# Patient Record
Sex: MALE | Race: BLACK OR AFRICAN AMERICAN | Employment: OTHER | URBAN - METROPOLITAN AREA
[De-identification: names, ages, dates, MRNs, and addresses within clinical notes are randomized per-mention and may not be internally consistent; named-entity substitution may affect disease eponyms.]

---

## 2017-02-23 ENCOUNTER — APPOINTMENT (OUTPATIENT)
Dept: ULTRASOUND IMAGING | Age: 62
End: 2017-02-23
Attending: NURSE PRACTITIONER
Payer: MEDICARE

## 2017-02-23 ENCOUNTER — HOSPITAL ENCOUNTER (EMERGENCY)
Age: 62
Discharge: HOME | End: 2017-02-24
Attending: STUDENT IN AN ORGANIZED HEALTH CARE EDUCATION/TRAINING PROGRAM
Payer: MEDICARE

## 2017-02-23 ENCOUNTER — APPOINTMENT (OUTPATIENT)
Dept: CT IMAGING | Age: 62
End: 2017-02-23
Attending: NURSE PRACTITIONER
Payer: MEDICARE

## 2017-02-23 DIAGNOSIS — N45.2 ORCHITIS: Primary | ICD-10-CM

## 2017-02-23 LAB
ALBUMIN SERPL BCP-MCNC: 3.5 G/DL (ref 3.5–5)
ALBUMIN/GLOB SERPL: 1.1 {RATIO} (ref 1.1–2.2)
ALP SERPL-CCNC: 44 U/L (ref 45–117)
ALT SERPL-CCNC: 28 U/L (ref 12–78)
ANION GAP BLD CALC-SCNC: 7 MMOL/L (ref 5–15)
APPEARANCE UR: CLEAR
AST SERPL W P-5'-P-CCNC: 25 U/L (ref 15–37)
BACTERIA URNS QL MICRO: NEGATIVE /HPF
BASOPHILS # BLD AUTO: 0 K/UL (ref 0–0.1)
BASOPHILS # BLD: 0 % (ref 0–1)
BILIRUB SERPL-MCNC: 0.3 MG/DL (ref 0.2–1)
BILIRUB UR QL: NEGATIVE
BUN SERPL-MCNC: 14 MG/DL (ref 6–20)
BUN/CREAT SERPL: 13 (ref 12–20)
CALCIUM SERPL-MCNC: 8.6 MG/DL (ref 8.5–10.1)
CHLORIDE SERPL-SCNC: 108 MMOL/L (ref 97–108)
CO2 SERPL-SCNC: 28 MMOL/L (ref 21–32)
COLOR UR: NORMAL
CREAT SERPL-MCNC: 1.12 MG/DL (ref 0.7–1.3)
EOSINOPHIL # BLD: 0.1 K/UL (ref 0–0.4)
EOSINOPHIL NFR BLD: 2 % (ref 0–7)
EPITH CASTS URNS QL MICRO: NORMAL /LPF
ERYTHROCYTE [DISTWIDTH] IN BLOOD BY AUTOMATED COUNT: 13.3 % (ref 11.5–14.5)
GLOBULIN SER CALC-MCNC: 3.1 G/DL (ref 2–4)
GLUCOSE SERPL-MCNC: 93 MG/DL (ref 65–100)
GLUCOSE UR STRIP.AUTO-MCNC: NEGATIVE MG/DL
HCT VFR BLD AUTO: 37.5 % (ref 36.6–50.3)
HGB BLD-MCNC: 12.5 G/DL (ref 12.1–17)
HGB UR QL STRIP: NEGATIVE
HYALINE CASTS URNS QL MICRO: NORMAL /LPF (ref 0–5)
KETONES UR QL STRIP.AUTO: NEGATIVE MG/DL
LEUKOCYTE ESTERASE UR QL STRIP.AUTO: NEGATIVE
LYMPHOCYTES # BLD AUTO: 27 % (ref 12–49)
LYMPHOCYTES # BLD: 1.2 K/UL (ref 0.8–3.5)
MCH RBC QN AUTO: 27.4 PG (ref 26–34)
MCHC RBC AUTO-ENTMCNC: 33.3 G/DL (ref 30–36.5)
MCV RBC AUTO: 82.1 FL (ref 80–99)
MONOCYTES # BLD: 0.7 K/UL (ref 0–1)
MONOCYTES NFR BLD AUTO: 16 % (ref 5–13)
NEUTS SEG # BLD: 2.5 K/UL (ref 1.8–8)
NEUTS SEG NFR BLD AUTO: 55 % (ref 32–75)
NITRITE UR QL STRIP.AUTO: NEGATIVE
PH UR STRIP: 5.5 [PH] (ref 5–8)
PLATELET # BLD AUTO: 145 K/UL (ref 150–400)
POTASSIUM SERPL-SCNC: 4 MMOL/L (ref 3.5–5.1)
PROT SERPL-MCNC: 6.6 G/DL (ref 6.4–8.2)
PROT UR STRIP-MCNC: NEGATIVE MG/DL
RBC # BLD AUTO: 4.57 M/UL (ref 4.1–5.7)
RBC #/AREA URNS HPF: NORMAL /HPF (ref 0–5)
SODIUM SERPL-SCNC: 143 MMOL/L (ref 136–145)
SP GR UR REFRACTOMETRY: 1.02 (ref 1–1.03)
TROPONIN I SERPL-MCNC: <0.04 NG/ML
UROBILINOGEN UR QL STRIP.AUTO: 1 EU/DL (ref 0.2–1)
WBC # BLD AUTO: 4.6 K/UL (ref 4.1–11.1)
WBC URNS QL MICRO: NORMAL /HPF (ref 0–4)

## 2017-02-23 PROCEDURE — 80053 COMPREHEN METABOLIC PANEL: CPT | Performed by: NURSE PRACTITIONER

## 2017-02-23 PROCEDURE — 96361 HYDRATE IV INFUSION ADD-ON: CPT

## 2017-02-23 PROCEDURE — 84484 ASSAY OF TROPONIN QUANT: CPT | Performed by: NURSE PRACTITIONER

## 2017-02-23 PROCEDURE — 99283 EMERGENCY DEPT VISIT LOW MDM: CPT

## 2017-02-23 PROCEDURE — 76870 US EXAM SCROTUM: CPT

## 2017-02-23 PROCEDURE — 96374 THER/PROPH/DIAG INJ IV PUSH: CPT

## 2017-02-23 PROCEDURE — 74011250636 HC RX REV CODE- 250/636: Performed by: NURSE PRACTITIONER

## 2017-02-23 PROCEDURE — 81001 URINALYSIS AUTO W/SCOPE: CPT | Performed by: NURSE PRACTITIONER

## 2017-02-23 PROCEDURE — 85025 COMPLETE CBC W/AUTO DIFF WBC: CPT | Performed by: NURSE PRACTITIONER

## 2017-02-23 PROCEDURE — 93005 ELECTROCARDIOGRAM TRACING: CPT

## 2017-02-23 PROCEDURE — 70450 CT HEAD/BRAIN W/O DYE: CPT

## 2017-02-23 PROCEDURE — 36415 COLL VENOUS BLD VENIPUNCTURE: CPT | Performed by: NURSE PRACTITIONER

## 2017-02-23 RX ORDER — GLUCOSAMINE HCL 500 MG
TABLET ORAL
COMMUNITY

## 2017-02-23 RX ORDER — NYSTATIN 100000 [USP'U]/G
POWDER TOPICAL 4 TIMES DAILY
COMMUNITY
End: 2021-01-14 | Stop reason: ALTCHOICE

## 2017-02-23 RX ORDER — SIMVASTATIN 80 MG/1
80 TABLET, FILM COATED ORAL
COMMUNITY

## 2017-02-23 RX ADMIN — SODIUM CHLORIDE 1000 ML: 900 INJECTION, SOLUTION INTRAVENOUS at 22:40

## 2017-02-24 VITALS
HEART RATE: 69 BPM | OXYGEN SATURATION: 99 % | TEMPERATURE: 97.4 F | BODY MASS INDEX: 27.4 KG/M2 | WEIGHT: 185 LBS | RESPIRATION RATE: 18 BRPM | SYSTOLIC BLOOD PRESSURE: 158 MMHG | HEIGHT: 69 IN | DIASTOLIC BLOOD PRESSURE: 88 MMHG

## 2017-02-24 LAB
ATRIAL RATE: 65 BPM
CALCULATED P AXIS, ECG09: 61 DEGREES
CALCULATED R AXIS, ECG10: -15 DEGREES
CALCULATED T AXIS, ECG11: -4 DEGREES
DIAGNOSIS, 93000: NORMAL
P-R INTERVAL, ECG05: 180 MS
Q-T INTERVAL, ECG07: 380 MS
QRS DURATION, ECG06: 88 MS
QTC CALCULATION (BEZET), ECG08: 395 MS
VENTRICULAR RATE, ECG03: 65 BPM

## 2017-02-24 PROCEDURE — 74011250636 HC RX REV CODE- 250/636: Performed by: NURSE PRACTITIONER

## 2017-02-24 RX ORDER — KETOROLAC TROMETHAMINE 30 MG/ML
30 INJECTION, SOLUTION INTRAMUSCULAR; INTRAVENOUS
Status: COMPLETED | OUTPATIENT
Start: 2017-02-24 | End: 2017-02-24

## 2017-02-24 RX ADMIN — KETOROLAC TROMETHAMINE 30 MG: 30 INJECTION, SOLUTION INTRAMUSCULAR at 00:30

## 2017-02-24 NOTE — ED NOTES
Pt requested warm blanket and lights to be turned off. Door closed per request. NAD noted at this time.

## 2017-02-24 NOTE — ED TRIAGE NOTES
Patient has been feeling \"discombobulated\" and confused this evening, as well as a little dizzy. States has also been having groin pain that started about a week ago, worse when driving.

## 2017-02-24 NOTE — DISCHARGE INSTRUCTIONS
Epididymitis and Orchitis: Care Instructions  Your Care Instructions    Epididymitis is pain and swelling of the tube that is attached to each testicle. This tube is called the epididymis. Orchitis is pain and swelling of the testicle. Infection with bacteria often causes these conditions. Sexually transmitted infections (STIs) also can cause both conditions. This is often the case in men younger than 28. Other causes in boys and older men are infections from surgery or having a catheter that drains urine. The mumps virus also can cause orchitis. Anti-inflammatory or pain medicines can help with the pain. Antibiotics are used if the problem is caused by bacteria. They are not used if a virus is the cause. Your testicle may stay swollen for many days or even a few weeks. The doctor has checked you carefully, but problems can develop later. If you notice any problems or new symptoms, get medical treatment right away. Follow-up care is a key part of your treatment and safety. Be sure to make and go to all appointments, and call your doctor if you are having problems. It's also a good idea to know your test results and keep a list of the medicines you take. How can you care for yourself at home? · If your doctor prescribed antibiotics, take them as directed. Do not stop taking them just because you feel better. You need to take the full course of antibiotics. · Ask your doctor if you can take an over-the-counter pain medicine, such as acetaminophen (Tylenol), ibuprofen (Advil, Motrin), or naproxen (Aleve). Be safe with medicines. Read and follow all instructions on the label. · Limit your activity to what is comfortable. · Wear snug underwear or an athletic supporter. This can help reduce pain. · Apply either cold or heat to the swollen area. Use the one that works best for your pain. Sitting in a warm bath for 15 minutes twice a day will help reduce the swelling more quickly.   · If you have been told that an STI may have caused your condition, do not have sex until your doctor says it is safe. This will prevent spreading the infection. Tell your sex partner or partners that they need to be checked. They may need treatment. When should you call for help? Call your doctor now or seek immediate medical care if:  · Your pain gets worse. · You have a new or higher fever. · You have new or more swelling of your testicle. · You have new belly pain, or your pain gets worse. Watch closely for changes in your health, and be sure to contact your doctor if:  · You do not get better as expected. Where can you learn more? Go to http://hernandez-edd.info/. Enter S360 in the search box to learn more about \"Epididymitis and Orchitis: Care Instructions. \"  Current as of: August 12, 2016  Content Version: 11.1  © 2113-9870 [employer]. Care instructions adapted under license by [employer] (which disclaims liability or warranty for this information). If you have questions about a medical condition or this instruction, always ask your healthcare professional. [address] any warranty or liability for your use of this information. We hope that we have addressed all of your medical concerns. The examination and treatment you received in the Emergency Department were for an emergent problem and were not intended as complete care. It is important that you follow up with your healthcare provider(s) for ongoing care. If your symptoms worsen or do not improve as expected, and you are unable to reach your usual health care provider(s), you should return to the Emergency Department. Today's healthcare is undergoing tremendous change, and patient satisfaction surveys are one of the many tools to assess the quality of medical care. You may receive a survey from the [employer] regarding your experience in the Emergency Department.   I hope that your experience has been completely positive, particularly the medical care that I provided. As such, please participate in the survey; anything less than excellent does not meet my expectations or intentions. [facility] and [facility] participate in nationally recognized quality of care measures. If your blood pressure is greater than 120/80, as reported below, we urge that you seek medical care to address the potential of high blood pressure, commonly known as hypertension. Hypertension can be hereditary or can be caused by certain medical conditions, pain, stress, or \"white coat syndrome. \"       Please make an appointment with your health care provider(s) for follow up of your Emergency Department visit. VITALS:   Patient Vitals for the past 8 hrs:   Temp Pulse Resp BP SpO2   02/23/17 2211 97.4 °F (36.3 °C) 69 18 154/74 98 %          Thank you for allowing us to provide you with medical care today. We realize that you have many choices for your emergency care needs. Please choose us in the future for any continued health care needs. Julianna iPnto  [facility], [address] [place], [place] [address].   Office: [phone]            Recent Results (from the past 24 hour(s))   URINALYSIS W/MICROSCOPIC    Collection Time: 02/23/17 10:40 PM   Result Value Ref Range    Color YELLOW/STRAW      Appearance CLEAR CLEAR      Specific gravity 1.018 1.003 - 1.030      pH (UA) 5.5 5.0 - 8.0      Protein NEGATIVE  NEG mg/dL    Glucose NEGATIVE  NEG mg/dL    Ketone NEGATIVE  NEG mg/dL    Bilirubin NEGATIVE  NEG      Blood NEGATIVE  NEG      Urobilinogen 1.0 0.2 - 1.0 EU/dL    Nitrites NEGATIVE  NEG      Leukocyte Esterase NEGATIVE  NEG      WBC 0-4 0 - 4 /hpf    RBC 0-5 0 - 5 /hpf    Epithelial cells FEW FEW /lpf    Bacteria NEGATIVE  NEG /hpf    Hyaline cast 0-2 0 - 5 /lpf   METABOLIC PANEL, COMPREHENSIVE    Collection Time: 02/23/17 10:51 PM Result Value Ref Range    Sodium 143 136 - 145 mmol/L    Potassium 4.0 3.5 - 5.1 mmol/L    Chloride 108 97 - 108 mmol/L    CO2 28 21 - 32 mmol/L    Anion gap 7 5 - 15 mmol/L    Glucose 93 65 - 100 mg/dL    BUN 14 6 - 20 MG/DL    Creatinine 1.12 0.70 - 1.30 MG/DL    BUN/Creatinine ratio 13 12 - 20      GFR est AA >60 >60 ml/min/1.73m2    GFR est non-AA >60 >60 ml/min/1.73m2    Calcium 8.6 8.5 - 10.1 MG/DL    Bilirubin, total 0.3 0.2 - 1.0 MG/DL    ALT (SGPT) 28 12 - 78 U/L    AST (SGOT) 25 15 - 37 U/L    Alk. phosphatase 44 (L) 45 - 117 U/L    Protein, total 6.6 6.4 - 8.2 g/dL    Albumin 3.5 3.5 - 5.0 g/dL    Globulin 3.1 2.0 - 4.0 g/dL    A-G Ratio 1.1 1.1 - 2.2     CBC WITH AUTOMATED DIFF    Collection Time: 02/23/17 10:51 PM   Result Value Ref Range    WBC 4.6 4.1 - 11.1 K/uL    RBC 4.57 4.10 - 5.70 M/uL    HGB 12.5 12.1 - 17.0 g/dL    HCT 37.5 36.6 - 50.3 %    MCV 82.1 80.0 - 99.0 FL    MCH 27.4 26.0 - 34.0 PG    MCHC 33.3 30.0 - 36.5 g/dL    RDW 13.3 11.5 - 14.5 %    PLATELET 040 (L) 409 - 400 K/uL    NEUTROPHILS 55 32 - 75 %    LYMPHOCYTES 27 12 - 49 %    MONOCYTES 16 (H) 5 - 13 %    EOSINOPHILS 2 0 - 7 %    BASOPHILS 0 0 - 1 %    ABS. NEUTROPHILS 2.5 1.8 - 8.0 K/UL    ABS. LYMPHOCYTES 1.2 0.8 - 3.5 K/UL    ABS. MONOCYTES 0.7 0.0 - 1.0 K/UL    ABS. EOSINOPHILS 0.1 0.0 - 0.4 K/UL    ABS.  BASOPHILS 0.0 0.0 - 0.1 K/UL   TROPONIN I    Collection Time: 02/23/17 10:51 PM   Result Value Ref Range    Troponin-I, Qt. <0.04 <0.05 ng/mL   EKG, 12 LEAD, INITIAL    Collection Time: 02/23/17 11:26 PM   Result Value Ref Range    Ventricular Rate 65 BPM    Atrial Rate 65 BPM    P-R Interval 180 ms    QRS Duration 88 ms    Q-T Interval 380 ms    QTC Calculation (Bezet) 395 ms    Calculated P Axis 61 degrees    Calculated R Axis -15 degrees    Calculated T Axis -4 degrees    Diagnosis       Normal sinus rhythm  Normal ECG  No previous ECGs available         Ct Head Wo Cont    Result Date: 2/24/2017  EXAM:  CT HEAD WO CONT INDICATION:   Headache, dizziness, confusion tonight COMPARISON: None. TECHNIQUE: Unenhanced CT of the head was performed using 5 mm images. Brain and bone windows were generated. CT dose reduction was achieved through use of a standardized protocol tailored for this examination and automatic exposure control for dose modulation. FINDINGS: The ventricles and sulci are normal in size, shape and configuration and midline. There is no significant white matter disease. There is no intracranial hemorrhage, extra-axial collection, mass, mass effect or midline shift. The basilar cisterns are open. No acute infarct is identified. The bone windows demonstrate no abnormalities. The visualized portions of the paranasal sinuses and mastoid air cells are clear. IMPRESSION: No acute abnormality. Us Scrotum/testicles    Result Date: 2/23/2017  EXAM:  US SCROTUM/TESTICLES INDICATION: Scrotal pain for one week. COMPARISON: None. TECHNIQUE: Real-time sonography of the scrotum was performed with a high frequency linear transducer. Multiple static images were obtained. Color Doppler evaluation was also performed. FINDINGS: RIGHT TESTICLE: The right testicle is normal in size and echotexture with normal color-flow. The right testis measures 3.8 x 2.5 x 1.7 cm and the right testicular volume is 8.26 mL. There is a small right hydrocele. RIGHT EPIDIDYMIS: The right epididymis is normal. LEFT TESTICLE: The left testicle is normal in size and echotexture with minimally increased color-flow. The left testis measures 3.4 x 1.9 x 1.6 cm and the left testicular volume is 5.5 mL. There is a 3 mm left epididymal cyst. There is a small hydrocele.  LEFT EPIDIDYMIS: The left epididymis is normal.     IMPRESSION: Minimally increased blood flow to the left testicle possibly related to orchitis

## 2017-02-24 NOTE — ED NOTES
I have reviewed discharge instructions with the patient. The patient verbalized understanding. Pt confirmed understanding of need for follow up with primary care provider. Pt is not in any current distress and shows no evidence of clinical instability. Pt left with all personal belongings. Pt states they are  driving. Pt states chief complaint has improved with treatment provided    PT is alert and oriented x 4, Pt is hemodynamically/respiratorily stable. Paperwork given by provider and reviewed with patient, opportunity for questions/clarification given.

## 2017-02-24 NOTE — ED PROVIDER NOTES
HPI Comments: The pt is a 57year old male who presents with several, seemingly unrelated complaints. Primarily he is complaining of bilateral testicular pain that has been ongoing for several weeks. He has been treated for perineum fungal infection with topical nystatin cream. His wife believes that he is too sexually active. Pt denies penile drainage or dysuria. No history of STI. He also states that he has felt dizzy and disoriented today. While driving to the ED, he developed a headache. Pt denies fevers, chills, night sweats, chest pain, pressure, SOB, FOX, PND, orthopnea, abdominal pain, n/v/d, melena, hematuria, dysuria, constipation, and syncope. Hypercholesterolemia    No past surgical history on file. Patient is a 57 y.o. male presenting with dizziness, groin pain, and headaches. The history is provided by the patient. Dizziness   Pertinent negatives include no agitation. Associated symptoms include headaches. Pertinent negatives include no shortness of breath, no chest pain, no vomiting, no confusion, no choking and no nausea. Groin Pain   Associated symptoms include headaches. Pertinent negatives include no chest pain, no abdominal pain and no shortness of breath. Headache    Associated symptoms include dizziness. Pertinent negatives include no fever, no palpitations, no shortness of breath, no weakness, no nausea and no vomiting. No past medical history on file. No past surgical history on file. No family history on file. Social History     Social History    Marital status:      Spouse name: N/A    Number of children: N/A    Years of education: N/A     Occupational History    Not on file.      Social History Main Topics    Smoking status: Not on file    Smokeless tobacco: Not on file    Alcohol use Not on file    Drug use: Not on file    Sexual activity: Not on file     Other Topics Concern    Not on file     Social History Narrative         ALLERGIES: Pcn [penicillins]    Review of Systems   Constitutional: Negative for activity change, appetite change, chills, diaphoresis, fatigue, fever and unexpected weight change. HENT: Negative for congestion, ear pain, rhinorrhea, sinus pressure, sore throat, tinnitus, trouble swallowing and voice change. Eyes: Negative for pain, discharge, redness and visual disturbance. Respiratory: Negative for apnea, cough, choking, chest tightness, shortness of breath, wheezing and stridor. Cardiovascular: Negative for chest pain, palpitations and leg swelling. Gastrointestinal: Negative for abdominal pain, constipation, nausea and vomiting. Endocrine: Negative for cold intolerance and heat intolerance. Genitourinary: Positive for testicular pain. Negative for difficulty urinating, dysuria, flank pain, hematuria and urgency. Musculoskeletal: Negative for arthralgias, back pain, gait problem, joint swelling, myalgias, neck pain and neck stiffness. Skin: Negative for color change, pallor, rash and wound. Allergic/Immunologic: Negative for immunocompromised state. Neurological: Positive for dizziness and headaches. Negative for tremors, syncope, weakness, light-headedness and numbness. Hematological: Does not bruise/bleed easily. Psychiatric/Behavioral: Negative for agitation, confusion and suicidal ideas. Vitals:    02/23/17 2211 02/24/17 0033   BP: 154/74 158/88   Pulse: 69    Resp: 18    Temp: 97.4 °F (36.3 °C)    SpO2: 98% 99%   Weight: 83.9 kg (185 lb)    Height: 5' 9\" (1.753 m)             Physical Exam   Constitutional: He is oriented to person, place, and time. He appears well-developed and well-nourished. No distress. HENT:   Head: Atraumatic. Nose: Nose normal.   Mouth/Throat: No oropharyngeal exudate. Eyes: Conjunctivae and EOM are normal. Right eye exhibits no discharge. Left eye exhibits no discharge. No scleral icterus. Neck: Normal range of motion. Neck supple. No JVD present.  No tracheal deviation present. No thyromegaly present. Cardiovascular: Normal rate, regular rhythm, normal heart sounds, intact distal pulses and normal pulses. PMI is not displaced. Exam reveals no gallop and no friction rub. No murmur heard. Pulmonary/Chest: Breath sounds normal. No accessory muscle usage or stridor. No respiratory distress. He has no decreased breath sounds. He has no wheezes. He has no rhonchi. He has no rales. He exhibits no tenderness. Abdominal: Soft. Bowel sounds are normal. He exhibits no distension and no mass. There is no hepatosplenomegaly. There is no tenderness. There is no rigidity, no rebound, no guarding, no CVA tenderness, no tenderness at McBurney's point and negative Pulido's sign. Hernia confirmed negative in the right inguinal area and confirmed negative in the left inguinal area. Genitourinary: Penis normal. Right testis shows tenderness. Right testis shows no mass and no swelling. Right testis is descended. Left testis shows tenderness. Left testis shows no mass and no swelling. Left testis is descended. Musculoskeletal: Normal range of motion. He exhibits no edema or tenderness. Lymphadenopathy:     He has no cervical adenopathy. Right: No inguinal adenopathy present. Left: No inguinal adenopathy present. Neurological: He is alert and oriented to person, place, and time. He has normal strength. No cranial nerve deficit or sensory deficit. He displays a negative Romberg sign. Coordination normal. GCS eye subscore is 4. GCS verbal subscore is 5. GCS motor subscore is 6. Skin: Skin is warm and dry. He is not diaphoretic. Psychiatric: He has a normal mood and affect. His behavior is normal.   Vitals reviewed.        MDM  Number of Diagnoses or Management Options  Orchitis:   Diagnosis management comments:    * routine laboratory data and UA   * IVF and toradol   * US testes   * CT head          Amount and/or Complexity of Data Reviewed  Clinical lab tests: ordered and reviewed  Tests in the radiology section of CPT®: ordered and reviewed  Review and summarize past medical records: yes  Discuss the patient with other providers: yes    Risk of Complications, Morbidity, and/or Mortality  General comments:    - pt states that he feels much better   - HA resolved   - no further dizziness    Patient Progress  Patient progress: stable    ED Course       Procedures      1:01 AM  Pt has been reevaluated. There are no new complaints, changes, or physical findings at this time. Medications have been reviewed w/ pt and/or family. Pt and/or family's questions have been answered. Pt and/or family expressed good understanding of the dx/tx/rx and is in agreement with plan of care. Pt instructed and agreed to f/u w/ PCP and Urology and to return to ED upon further deterioration. Pt is ready for discharge.     LABORATORY TESTS:  Recent Results (from the past 12 hour(s))   URINALYSIS W/MICROSCOPIC    Collection Time: 02/23/17 10:40 PM   Result Value Ref Range    Color YELLOW/STRAW      Appearance CLEAR CLEAR      Specific gravity 1.018 1.003 - 1.030      pH (UA) 5.5 5.0 - 8.0      Protein NEGATIVE  NEG mg/dL    Glucose NEGATIVE  NEG mg/dL    Ketone NEGATIVE  NEG mg/dL    Bilirubin NEGATIVE  NEG      Blood NEGATIVE  NEG      Urobilinogen 1.0 0.2 - 1.0 EU/dL    Nitrites NEGATIVE  NEG      Leukocyte Esterase NEGATIVE  NEG      WBC 0-4 0 - 4 /hpf    RBC 0-5 0 - 5 /hpf    Epithelial cells FEW FEW /lpf    Bacteria NEGATIVE  NEG /hpf    Hyaline cast 0-2 0 - 5 /lpf   METABOLIC PANEL, COMPREHENSIVE    Collection Time: 02/23/17 10:51 PM   Result Value Ref Range    Sodium 143 136 - 145 mmol/L    Potassium 4.0 3.5 - 5.1 mmol/L    Chloride 108 97 - 108 mmol/L    CO2 28 21 - 32 mmol/L    Anion gap 7 5 - 15 mmol/L    Glucose 93 65 - 100 mg/dL    BUN 14 6 - 20 MG/DL    Creatinine 1.12 0.70 - 1.30 MG/DL    BUN/Creatinine ratio 13 12 - 20      GFR est AA >60 >60 ml/min/1.73m2    GFR est non-AA >60 >60 ml/min/1.73m2    Calcium 8.6 8.5 - 10.1 MG/DL    Bilirubin, total 0.3 0.2 - 1.0 MG/DL    ALT (SGPT) 28 12 - 78 U/L    AST (SGOT) 25 15 - 37 U/L    Alk. phosphatase 44 (L) 45 - 117 U/L    Protein, total 6.6 6.4 - 8.2 g/dL    Albumin 3.5 3.5 - 5.0 g/dL    Globulin 3.1 2.0 - 4.0 g/dL    A-G Ratio 1.1 1.1 - 2.2     CBC WITH AUTOMATED DIFF    Collection Time: 02/23/17 10:51 PM   Result Value Ref Range    WBC 4.6 4.1 - 11.1 K/uL    RBC 4.57 4.10 - 5.70 M/uL    HGB 12.5 12.1 - 17.0 g/dL    HCT 37.5 36.6 - 50.3 %    MCV 82.1 80.0 - 99.0 FL    MCH 27.4 26.0 - 34.0 PG    MCHC 33.3 30.0 - 36.5 g/dL    RDW 13.3 11.5 - 14.5 %    PLATELET 438 (L) 683 - 400 K/uL    NEUTROPHILS 55 32 - 75 %    LYMPHOCYTES 27 12 - 49 %    MONOCYTES 16 (H) 5 - 13 %    EOSINOPHILS 2 0 - 7 %    BASOPHILS 0 0 - 1 %    ABS. NEUTROPHILS 2.5 1.8 - 8.0 K/UL    ABS. LYMPHOCYTES 1.2 0.8 - 3.5 K/UL    ABS. MONOCYTES 0.7 0.0 - 1.0 K/UL    ABS. EOSINOPHILS 0.1 0.0 - 0.4 K/UL    ABS. BASOPHILS 0.0 0.0 - 0.1 K/UL   TROPONIN I    Collection Time: 02/23/17 10:51 PM   Result Value Ref Range    Troponin-I, Qt. <0.04 <0.05 ng/mL   EKG, 12 LEAD, INITIAL    Collection Time: 02/23/17 11:26 PM   Result Value Ref Range    Ventricular Rate 65 BPM    Atrial Rate 65 BPM    P-R Interval 180 ms    QRS Duration 88 ms    Q-T Interval 380 ms    QTC Calculation (Bezet) 395 ms    Calculated P Axis 61 degrees    Calculated R Axis -15 degrees    Calculated T Axis -4 degrees    Diagnosis       Normal sinus rhythm  Normal ECG  No previous ECGs available         IMAGING RESULTS:  CT HEAD WO CONT   Final Result      US SCROTUM/TESTICLES   Final Result        Ct Head Wo Cont    Result Date: 2/24/2017  EXAM:  CT HEAD WO CONT INDICATION:   Headache, dizziness, confusion tonight COMPARISON: None. TECHNIQUE: Unenhanced CT of the head was performed using 5 mm images. Brain and bone windows were generated.   CT dose reduction was achieved through use of a standardized protocol tailored for this examination and automatic exposure control for dose modulation. FINDINGS: The ventricles and sulci are normal in size, shape and configuration and midline. There is no significant white matter disease. There is no intracranial hemorrhage, extra-axial collection, mass, mass effect or midline shift. The basilar cisterns are open. No acute infarct is identified. The bone windows demonstrate no abnormalities. The visualized portions of the paranasal sinuses and mastoid air cells are clear. IMPRESSION: No acute abnormality. Us Scrotum/testicles    Result Date: 2/23/2017  EXAM:  US SCROTUM/TESTICLES INDICATION: Scrotal pain for one week. COMPARISON: None. TECHNIQUE: Real-time sonography of the scrotum was performed with a high frequency linear transducer. Multiple static images were obtained. Color Doppler evaluation was also performed. FINDINGS: RIGHT TESTICLE: The right testicle is normal in size and echotexture with normal color-flow. The right testis measures 3.8 x 2.5 x 1.7 cm and the right testicular volume is 8.26 mL. There is a small right hydrocele. RIGHT EPIDIDYMIS: The right epididymis is normal. LEFT TESTICLE: The left testicle is normal in size and echotexture with minimally increased color-flow. The left testis measures 3.4 x 1.9 x 1.6 cm and the left testicular volume is 5.5 mL. There is a 3 mm left epididymal cyst. There is a small hydrocele. LEFT EPIDIDYMIS: The left epididymis is normal.     IMPRESSION: Minimally increased blood flow to the left testicle possibly related to orchitis         MEDICATIONS GIVEN:  Medications   sodium chloride 0.9 % bolus infusion 1,000 mL (0 mL IntraVENous IV Completed 2/24/17 0101)   ketorolac (TORADOL) injection 30 mg (30 mg IntraVENous Given 2/24/17 0030)       IMPRESSION:  1. Orchitis        PLAN:  1.    Current Discharge Medication List      CONTINUE these medications which have NOT CHANGED    Details   simvastatin (ZOCOR) 80 mg tablet Take 80 mg by mouth nightly. Cholecalciferol, Vitamin D3, 3,000 unit tab Take  by mouth. CATALINA ROOT, BULK, by Does Not Apply route. nystatin (MYCOSTATIN) powder Apply  to affected area four (4) times daily. 2.   Follow-up Information     Follow up With Details Comments Patricia Green MD In 2 days  [address] [place] [address] [phone]      Your Primary Care Physician  In 1 day          3.  Supportive care     Return to ED if worse       Magda Turner NP  1:01 AM

## 2019-07-06 ENCOUNTER — APPOINTMENT (OUTPATIENT)
Dept: ULTRASOUND IMAGING | Age: 64
End: 2019-07-06
Attending: NURSE PRACTITIONER
Payer: MEDICARE

## 2019-07-06 ENCOUNTER — HOSPITAL ENCOUNTER (EMERGENCY)
Age: 64
Discharge: HOME | End: 2019-07-06
Attending: EMERGENCY MEDICINE
Payer: MEDICARE

## 2019-07-06 VITALS
HEART RATE: 69 BPM | OXYGEN SATURATION: 96 % | DIASTOLIC BLOOD PRESSURE: 95 MMHG | RESPIRATION RATE: 16 BRPM | TEMPERATURE: 98.6 F | WEIGHT: 180 LBS | SYSTOLIC BLOOD PRESSURE: 177 MMHG | BODY MASS INDEX: 26.66 KG/M2 | HEIGHT: 69 IN

## 2019-07-06 DIAGNOSIS — N45.2 ORCHITIS: Primary | ICD-10-CM

## 2019-07-06 DIAGNOSIS — N44.2 TESTICULAR CYST: ICD-10-CM

## 2019-07-06 LAB
ALBUMIN SERPL-MCNC: 3.8 G/DL (ref 3.5–5)
ALBUMIN/GLOB SERPL: 1.1 {RATIO} (ref 1.1–2.2)
ALP SERPL-CCNC: 54 U/L (ref 45–117)
ALT SERPL-CCNC: 19 U/L (ref 12–78)
ANION GAP SERPL CALC-SCNC: 4 MMOL/L (ref 5–15)
APPEARANCE UR: CLEAR
AST SERPL-CCNC: 17 U/L (ref 15–37)
BACTERIA URNS QL MICRO: NEGATIVE /HPF
BASOPHILS # BLD: 0 K/UL (ref 0–0.1)
BASOPHILS NFR BLD: 1 % (ref 0–1)
BILIRUB SERPL-MCNC: 0.4 MG/DL (ref 0.2–1)
BILIRUB UR QL: NEGATIVE
BUN SERPL-MCNC: 11 MG/DL (ref 6–20)
BUN/CREAT SERPL: 9 (ref 12–20)
CALCIUM SERPL-MCNC: 9 MG/DL (ref 8.5–10.1)
CHLORIDE SERPL-SCNC: 109 MMOL/L (ref 97–108)
CO2 SERPL-SCNC: 31 MMOL/L (ref 21–32)
COLOR UR: NORMAL
CREAT SERPL-MCNC: 1.17 MG/DL (ref 0.7–1.3)
DIFFERENTIAL METHOD BLD: ABNORMAL
EOSINOPHIL # BLD: 0 K/UL (ref 0–0.4)
EOSINOPHIL NFR BLD: 1 % (ref 0–7)
EPITH CASTS URNS QL MICRO: NORMAL /LPF
ERYTHROCYTE [DISTWIDTH] IN BLOOD BY AUTOMATED COUNT: 13.8 % (ref 11.5–14.5)
GLOBULIN SER CALC-MCNC: 3.6 G/DL (ref 2–4)
GLUCOSE SERPL-MCNC: 90 MG/DL (ref 65–100)
GLUCOSE UR STRIP.AUTO-MCNC: NEGATIVE MG/DL
HCT VFR BLD AUTO: 43.2 % (ref 36.6–50.3)
HGB BLD-MCNC: 13.4 G/DL (ref 12.1–17)
HGB UR QL STRIP: NEGATIVE
HYALINE CASTS URNS QL MICRO: NORMAL /LPF (ref 0–5)
IMM GRANULOCYTES # BLD AUTO: 0 K/UL (ref 0–0.04)
IMM GRANULOCYTES NFR BLD AUTO: 0 % (ref 0–0.5)
KETONES UR QL STRIP.AUTO: NEGATIVE MG/DL
LEUKOCYTE ESTERASE UR QL STRIP.AUTO: NEGATIVE
LYMPHOCYTES # BLD: 1 K/UL (ref 0.8–3.5)
LYMPHOCYTES NFR BLD: 21 % (ref 12–49)
MCH RBC QN AUTO: 26.6 PG (ref 26–34)
MCHC RBC AUTO-ENTMCNC: 31 G/DL (ref 30–36.5)
MCV RBC AUTO: 85.9 FL (ref 80–99)
MONOCYTES # BLD: 0.6 K/UL (ref 0–1)
MONOCYTES NFR BLD: 13 % (ref 5–13)
NEUTS SEG # BLD: 2.9 K/UL (ref 1.8–8)
NEUTS SEG NFR BLD: 64 % (ref 32–75)
NITRITE UR QL STRIP.AUTO: NEGATIVE
NRBC # BLD: 0 K/UL (ref 0–0.01)
NRBC BLD-RTO: 0 PER 100 WBC
PH UR STRIP: 6.5 [PH] (ref 5–8)
PLATELET # BLD AUTO: 145 K/UL (ref 150–400)
PMV BLD AUTO: 11.4 FL (ref 8.9–12.9)
POTASSIUM SERPL-SCNC: 3.9 MMOL/L (ref 3.5–5.1)
PROT SERPL-MCNC: 7.4 G/DL (ref 6.4–8.2)
PROT UR STRIP-MCNC: NEGATIVE MG/DL
RBC # BLD AUTO: 5.03 M/UL (ref 4.1–5.7)
RBC #/AREA URNS HPF: NORMAL /HPF (ref 0–5)
SODIUM SERPL-SCNC: 144 MMOL/L (ref 136–145)
SP GR UR REFRACTOMETRY: 1 (ref 1–1.03)
UR CULT HOLD, URHOLD: NORMAL
UROBILINOGEN UR QL STRIP.AUTO: 1 EU/DL (ref 0.2–1)
WBC # BLD AUTO: 4.5 K/UL (ref 4.1–11.1)
WBC URNS QL MICRO: NORMAL /HPF (ref 0–4)

## 2019-07-06 PROCEDURE — 99283 EMERGENCY DEPT VISIT LOW MDM: CPT

## 2019-07-06 PROCEDURE — 80053 COMPREHEN METABOLIC PANEL: CPT

## 2019-07-06 PROCEDURE — 74011250636 HC RX REV CODE- 250/636: Performed by: NURSE PRACTITIONER

## 2019-07-06 PROCEDURE — 96372 THER/PROPH/DIAG INJ SC/IM: CPT

## 2019-07-06 PROCEDURE — 85025 COMPLETE CBC W/AUTO DIFF WBC: CPT

## 2019-07-06 PROCEDURE — 36415 COLL VENOUS BLD VENIPUNCTURE: CPT

## 2019-07-06 PROCEDURE — 76870 US EXAM SCROTUM: CPT

## 2019-07-06 PROCEDURE — 81001 URINALYSIS AUTO W/SCOPE: CPT

## 2019-07-06 PROCEDURE — 87491 CHLMYD TRACH DNA AMP PROBE: CPT

## 2019-07-06 RX ORDER — DOXYCYCLINE HYCLATE 100 MG
100 TABLET ORAL 2 TIMES DAILY
Qty: 20 TAB | Refills: 0 | Status: SHIPPED | OUTPATIENT
Start: 2019-07-06 | End: 2019-07-16

## 2019-07-06 RX ADMIN — LIDOCAINE HYDROCHLORIDE 250 MG: 10 INJECTION, SOLUTION EPIDURAL; INFILTRATION; INTRACAUDAL; PERINEURAL at 17:20

## 2019-07-06 NOTE — DISCHARGE INSTRUCTIONS
Epididymitis and Orchitis in Children: Care Instructions  Your Care Instructions    Epididymitis is pain and swelling of the tube that attaches to each testicle. This tube is called the epididymis. Orchitis is pain and swelling of the testicle. Infection with bacteria often causes these problems. Other causes are infections from surgery or from a catheter that drains urine. The mumps virus also can cause orchitis. Pain medicine or anti-inflammatory medicines can help with the pain. Antibiotics are used if the problem is caused by bacteria. They are not used if a virus is the cause. The doctor has checked your child carefully, but problems can develop later. If you notice any problems or new symptoms, get medical treatment right away. Follow-up care is a key part of your child's treatment and safety. Be sure to make and go to all appointments, and call your doctor if your child is having problems. It's also a good idea to know your child's test results and keep a list of the medicines your child takes. How can you care for your child at home? · If the doctor prescribed antibiotics for your child, give them as directed. Do not stop using them just because your child feels better. Your child needs to take the full course of antibiotics. · Be safe with medicines. Read and follow all instructions on the label. ? If the doctor gave your child a prescription medicine for pain, give it as prescribed. ? If your child is not taking a prescription pain medicine, ask your doctor if your child can take an over-the-counter medicine. · Limit your child's activity to what is comfortable. · Have your child wear snug underwear or an athletic supporter. This can help reduce pain. · Apply either cold or heat to the swollen area. Use the one that works best for your child's pain. You may have your child sit in a warm bath for 15 minutes twice a day. This will help reduce the swelling more quickly.   When should you call for help? Call your doctor now or seek immediate medical care if:    · Your child's pain gets worse.     · Your child has a new or higher fever.     · Your child has new or more swelling of the testicle.     · Your child has new belly pain or the pain gets worse.    Watch closely for changes in your child's health, and be sure to contact your doctor if:    · Your child does not get better as expected. Where can you learn more? Go to http://hernandez-edd.info/. Enter X853 in the search box to learn more about \"Epididymitis and Orchitis in Children: Care Instructions. \"  Current as of: March 20, 2018  Content Version: 11.9  © 3031-6275 [employer]. Care instructions adapted under license by [employer] (which disclaims liability or warranty for this information). If you have questions about a medical condition or this instruction, always ask your healthcare professional. [place] [address] any warranty or liability for your use of this information. Patient Education        Epididymitis and Orchitis: Care Instructions  Your Care Instructions    Epididymitis is pain and swelling of the tube that is attached to each testicle. This tube is called the epididymis. Orchitis is pain and swelling of the testicle. Infection with bacteria often causes these conditions. Sexually transmitted infections (STIs) also can cause both conditions. This is often the case in men younger than 28. Other causes in boys and older men are infections from surgery or having a catheter that drains urine. The mumps virus also can cause orchitis. Anti-inflammatory or pain medicines can help with the pain. Antibiotics are used if the problem is caused by bacteria. They are not used if a virus is the cause. Your testicle may stay swollen for many days or even a few weeks. The doctor has checked you carefully, but problems can develop later.  If you notice any problems or new symptoms, get medical treatment right away. Follow-up care is a key part of your treatment and safety. Be sure to make and go to all appointments, and call your doctor if you are having problems. It's also a good idea to know your test results and keep a list of the medicines you take. How can you care for yourself at home? · If your doctor prescribed antibiotics, take them as directed. Do not stop taking them just because you feel better. You need to take the full course of antibiotics. · Ask your doctor if you can take an over-the-counter pain medicine, such as acetaminophen (Tylenol), ibuprofen (Advil, Motrin), or naproxen (Aleve). Be safe with medicines. Read and follow all instructions on the label. · Limit your activity to what is comfortable. · Wear snug underwear or an athletic supporter. This can help reduce pain. · Apply either cold or heat to the swollen area. Use the one that works best for your pain. Sitting in a warm bath for 15 minutes twice a day will help reduce the swelling more quickly. · If you have been told that an STI may have caused your condition, do not have sex until your doctor says it is safe. This will prevent spreading the infection. Tell your sex partner or partners that they need to be checked. They may need treatment. When should you call for help? Call your doctor now or seek immediate medical care if:    · Your pain gets worse.     · You have a new or higher fever.     · You have new or more swelling of your testicle.     · You have new belly pain, or your pain gets worse.    Watch closely for changes in your health, and be sure to contact your doctor if:    · You do not get better as expected. Where can you learn more? Go to http://hernandez-edd.info/. Enter S360 in the search box to learn more about \"Epididymitis and Orchitis: Care Instructions. \"  Current as of: March 20, 2018  Content Version: 11.9  © 9484-8987 [employer].  Care instructions adapted under license by [employer] (which disclaims liability or warranty for this information). If you have questions about a medical condition or this instruction, always ask your healthcare professional. [address] any warranty or liability for your use of this information.

## 2019-07-06 NOTE — ED PROVIDER NOTES
I have evaluated the patient as the Provider in Triage. I have reviewed His vital signs and the triage nurse assessment. I have talked with the patient and any available family and advised that I am the provider in triage and have ordered the appropriate study to initiate their work up based on the clinical presentation during my assessment. I have advised that the patient will be accommodated in the Main ED as soon as possible. I have also requested to contact the triage nurse or myself immediately if the patient experiences any changes in their condition during this brief waiting period. 75 y.o. male with no significant past medical history who presents from private vehicle with chief complaint of testicle pain. Pt reports testicle pain, accompanied by rectal pain since his colonoscopy 2 years ago. There are no other acute medical concerns at this time. Note written by Tucker Sanford, as dictated by Tigist Allen MD 2:13 PM    The history is provided by the patient. No  was used. No past medical history on file. No past surgical history on file. No family history on file.     Social History     Socioeconomic History    Marital status:      Spouse name: Not on file    Number of children: Not on file    Years of education: Not on file    Highest education level: Not on file   Occupational History    Not on file   Social Needs    Financial resource strain: Not on file    Food insecurity:     Worry: Not on file     Inability: Not on file    Transportation needs:     Medical: Not on file     Non-medical: Not on file   Tobacco Use    Smoking status: Not on file   Substance and Sexual Activity    Alcohol use: Not on file    Drug use: Not on file    Sexual activity: Not on file   Lifestyle    Physical activity:     Days per week: Not on file     Minutes per session: Not on file    Stress: Not on file   Relationships    Social connections: Talks on phone: Not on file     Gets together: Not on file     Attends Mu-ism service: Not on file     Active member of club or organization: Not on file     Attends meetings of clubs or organizations: Not on file     Relationship status: Not on file    Intimate partner violence:     Fear of current or ex partner: Not on file     Emotionally abused: Not on file     Physically abused: Not on file     Forced sexual activity: Not on file   Other Topics Concern    Not on file   Social History Narrative    Not on file         ALLERGIES: Pcn [penicillins]    Review of Systems   Constitutional: Negative. HENT: Negative. Eyes: Negative. Respiratory: Negative. Gastrointestinal: Negative. Endocrine: Negative. Genitourinary: Positive for penile pain and testicular pain. Negative for decreased urine volume, difficulty urinating, discharge, dysuria, enuresis, flank pain, frequency, genital sores, hematuria, penile swelling, scrotal swelling and urgency. Allergic/Immunologic: Negative. Neurological: Negative. Hematological: Negative. Psychiatric/Behavioral: Negative. There were no vitals filed for this visit. Physical Exam   Constitutional: He is oriented to person, place, and time. He appears well-developed and well-nourished. No distress. HENT:   Head: Normocephalic and atraumatic. Right Ear: External ear normal.   Left Ear: External ear normal.   Nose: Nose normal.   Mouth/Throat: Oropharynx is clear and moist.   Eyes: Pupils are equal, round, and reactive to light. Conjunctivae and EOM are normal. No scleral icterus. Neck: Normal range of motion. Neck supple. No JVD present. No tracheal deviation present. Cardiovascular: Normal rate, regular rhythm, normal heart sounds and intact distal pulses. Pulmonary/Chest: Effort normal and breath sounds normal. He has no wheezes. Abdominal: Soft. Bowel sounds are normal. He exhibits no distension. There is no tenderness. There is no guarding. Hernia confirmed negative in the right inguinal area and confirmed negative in the left inguinal area. Genitourinary: Cremasteric reflex is present. Right testis shows no swelling and no tenderness. Left testis shows tenderness. Left testis shows no swelling. Uncircumcised. No phimosis or penile tenderness. No discharge found. Musculoskeletal: Normal range of motion. He exhibits no edema, tenderness or deformity. Lymphadenopathy:     He has no cervical adenopathy. No inguinal adenopathy noted on the right or left side. Neurological: He is alert and oriented to person, place, and time. No cranial nerve deficit. He exhibits normal muscle tone. Coordination normal.   Skin: Skin is warm and dry. Capillary refill takes less than 2 seconds. No rash noted. He is not diaphoretic. Psychiatric: He has a normal mood and affect. His behavior is normal. Judgment and thought content normal.   Nursing note and vitals reviewed. MDM  Number of Diagnoses or Management Options  Orchitis: new and requires workup  Testicular cyst: new and requires workup  Diagnosis management comments:     Presents with acute left sided testicular pain and discomfort. Has had orchitis in the past. Presently, is followed for \"male problems\" at [facility]. Physical examination reveals some left sided testicular pain, but otherwise unremarkable. US is normal with the exception of some small right sided testicular cysts. Will treat and have him follow up with urology.         Amount and/or Complexity of Data Reviewed  Clinical lab tests: ordered  Tests in the radiology section of CPT®: ordered  Discuss the patient with other providers: yes  Independent visualization of images, tracings, or specimens: yes           Procedures

## 2019-07-08 LAB
C TRACH DNA SPEC QL NAA+PROBE: NEGATIVE
N GONORRHOEA DNA SPEC QL NAA+PROBE: NEGATIVE
SAMPLE TYPE: NORMAL
SERVICE CMNT-IMP: NORMAL
SPECIMEN SOURCE: NORMAL

## 2020-10-01 ENCOUNTER — HOSPITAL ENCOUNTER (EMERGENCY)
Age: 65
Discharge: HOME | End: 2020-10-01
Payer: MEDICARE

## 2020-10-01 VITALS
OXYGEN SATURATION: 100 % | TEMPERATURE: 98.6 F | SYSTOLIC BLOOD PRESSURE: 147 MMHG | RESPIRATION RATE: 18 BRPM | HEART RATE: 59 BPM | DIASTOLIC BLOOD PRESSURE: 72 MMHG | HEIGHT: 69 IN | BODY MASS INDEX: 28.88 KG/M2 | WEIGHT: 195 LBS

## 2020-10-01 DIAGNOSIS — H16.292 CHEMICAL KERATOCONJUNCTIVITIS OF LEFT EYE: ICD-10-CM

## 2020-10-01 DIAGNOSIS — T26.92XA CHEMICAL INJURY OF LEFT EYE: Primary | ICD-10-CM

## 2020-10-01 PROCEDURE — 99284 EMERGENCY DEPT VISIT MOD MDM: CPT

## 2020-10-01 PROCEDURE — 74011000250 HC RX REV CODE- 250: Performed by: NURSE PRACTITIONER

## 2020-10-01 RX ORDER — ERYTHROMYCIN 5 MG/G
OINTMENT OPHTHALMIC
Qty: 3.5 G | Refills: 0 | Status: SHIPPED | OUTPATIENT
Start: 2020-10-01 | End: 2020-10-08

## 2020-10-01 RX ORDER — SODIUM CHLORIDE 9 MG/ML
50 INJECTION, SOLUTION INTRAVENOUS ONCE
Status: DISCONTINUED | OUTPATIENT
Start: 2020-10-01 | End: 2020-10-01 | Stop reason: HOSPADM

## 2020-10-01 RX ORDER — TETRACAINE HYDROCHLORIDE 5 MG/ML
1 SOLUTION OPHTHALMIC
Status: COMPLETED | OUTPATIENT
Start: 2020-10-01 | End: 2020-10-01

## 2020-10-01 RX ADMIN — FLUORESCEIN SODIUM 1 STRIP: 1 STRIP OPHTHALMIC at 18:29

## 2020-10-01 RX ADMIN — TETRACAINE HYDROCHLORIDE 1 DROP: 5 SOLUTION OPHTHALMIC at 18:39

## 2020-10-01 NOTE — ED TRIAGE NOTES
States he was working on removing a battery from a car when [employer]" struck him in L eye, states he flushed prior to arriving in ER with little relief, states vision is poor in L eye, L eye very red, irritated, and tearful Tachypnea

## 2020-10-01 NOTE — ED PROVIDER NOTES
EMERGENCY DEPARTMENT HISTORY AND PHYSICAL EXAM      Date: 10/1/2020  Patient Name: Sarkis Sherman Friday    History of Presenting Illness     Chief Complaint   Patient presents with    Foreign Body in Eye       History Provided By: Patient    HPI: Sarkis Sherman Friday, 77 y.o. male with a past medical history significant diabetes and hypertension presents to the ED with cc of body in eye. Patient states he was working on a car where he might of splashed battery acid in his eye. Patient flushed his eye at home for 5 minutes. Patient still feels like there is a foreign body in his left eye. There are no other complaints, changes, or physical findings at this time. PCP: Brayden, MD Ekta    Current Facility-Administered Medications   Medication Dose Route Frequency Provider Last Rate Last Dose    0.9% sodium chloride infusion  50 mL/hr Ophthalmic Daphne Harada, NP         Current Outpatient Medications   Medication Sig Dispense Refill    erythromycin (ILOTYCIN) ophthalmic ointment Half inch 4 times a day to left eye 3.5 g 0    simvastatin (ZOCOR) 80 mg tablet Take 80 mg by mouth nightly.  Cholecalciferol, Vitamin D3, 3,000 unit tab Take  by mouth.  nystatin (MYCOSTATIN) powder Apply  to affected area four (4) times daily.  CATALINA ROOT, BULK, by Does Not Apply route. Past History     Past Medical History:  No past medical history on file. Past Surgical History:  No past surgical history on file. Family History:  No family history on file. Social History:  Social History     Tobacco Use    Smoking status: Not on file   Substance Use Topics    Alcohol use: Not on file    Drug use: Not on file       Allergies: Allergies   Allergen Reactions    Pcn [Penicillins] Hives         Review of Systems     Review of Systems   Constitutional: Negative for chills and fever. HENT: Negative for dental problem and sore throat. Eyes: Positive for pain and redness.  Negative for photophobia, discharge and visual disturbance. Respiratory: Negative for cough and chest tightness. Cardiovascular: Negative for chest pain. Gastrointestinal: Negative for diarrhea and nausea. Genitourinary: Negative for difficulty urinating and frequency. Musculoskeletal: Negative for gait problem and joint swelling. Neurological: Positive for headaches. Negative for numbness. Hematological: Negative for adenopathy. Does not bruise/bleed easily. Psychiatric/Behavioral: Negative for behavioral problems and suicidal ideas. Physical Exam     Physical Exam  Constitutional:       General: He is not in acute distress. Appearance: Normal appearance. He is not ill-appearing or toxic-appearing. HENT:      Head: Normocephalic and atraumatic. Nose: Nose normal.      Mouth/Throat:      Mouth: Mucous membranes are moist.   Eyes:      General: Lids are normal. Lids are everted, no foreign bodies appreciated. Vision grossly intact. Gaze aligned appropriately. Left eye: No foreign body, discharge or hordeolum. Extraocular Movements: Extraocular movements intact. Conjunctiva/sclera:      Left eye: Left conjunctiva is not injected. No chemosis, exudate or hemorrhage. Pupils: Pupils are equal, round, and reactive to light. Comments: Patient's pH of eye was checked after flushing and it was 7-7.5. Neck:      Musculoskeletal: Normal range of motion and neck supple. Cardiovascular:      Rate and Rhythm: Normal rate and regular rhythm. Pulmonary:      Effort: Pulmonary effort is normal.      Breath sounds: Normal breath sounds. Abdominal:      General: Bowel sounds are normal.   Musculoskeletal: Normal range of motion. Skin:     General: Skin is warm and dry. Capillary Refill: Capillary refill takes less than 2 seconds. Neurological:      General: No focal deficit present. Mental Status: He is alert and oriented to person, place, and time.    Psychiatric:         Mood and Affect: Mood normal.         Behavior: Behavior normal.         Diagnostic Study Results     Labs -   No results found for this or any previous visit (from the past 12 hour(s)). Radiologic Studies -   [unfilled]  CT Results  (Last 48 hours)    None        CXR Results  (Last 48 hours)    None          Medical Decision Making and ED Course   I am the first provider for this patient. I reviewed the vital signs, available nursing notes, past medical history, past surgical history, family history and social history. Vital Signs-Reviewed the patient's vital signs. Patient Vitals for the past 12 hrs:   Temp Pulse Resp BP SpO2   10/01/20 1700  75 20  99 %   10/01/20 1650 98.6 °F (37 °C) 67 20 (!) 181/82 99 %           Records Reviewed: Nursing Notes and Old Medical Records    The patient presents with foreign body sensation left eye with a differential diagnosis of chemical burn, corneal ulcer corneal abrasion ruptured globe      Provider Notes (Medical Decision Making): Under fluorescein patient had no appreciable abrasions. Patient's pH was normal.  Patient has follow-up with [facility] tomorrow. [facility]       ED Course:   Initial assessment performed. The patients presenting problems have been discussed, and they are in agreement with the care plan formulated and outlined with them. I have encouraged them to ask questions as they arise throughout their visit. Procedures       Terrilee Dilling, NP  Procedures         HYPERTENSION COUNSELING: Education was provided to the patient today regarding their hypertension. Patient is made aware of their elevated blood pressure and is instructed to follow up this week with their Primary Care for a recheck. Patient is counseled regarding consequences of chronic, uncontrolled hypertension including kidney disease, heart disease, stroke or even death. Patient states their understanding and agrees to follow up this week.  Additionally, during their visit, I discussed sodium restriction, maintaining ideal body weight and regular exercise program as physiologic means to achieve blood pressure control. The patient will strive towards this. Disposition       Discharged      DISCHARGE PLAN:  1. Current Discharge Medication List      CONTINUE these medications which have NOT CHANGED    Details   simvastatin (ZOCOR) 80 mg tablet Take 80 mg by mouth nightly. Cholecalciferol, Vitamin D3, 3,000 unit tab Take  by mouth. nystatin (MYCOSTATIN) powder Apply  to affected area four (4) times daily. CATALINA ROOT, BULK, by Does Not Apply route. 2.   Follow-up Information     Follow up With Specialties Details Why [address] [place] [id number]        3. Return to ED if worse     Diagnosis     Clinical Impression:   1. Chemical injury of left eye    2. Chemical keratoconjunctivitis of left eye        Attestations:    Reid Mobley NP    Please note that this dictation was completed with [employer], the computer voice recognition software. Quite often unanticipated grammatical, syntax, homophones, and other interpretive errors are inadvertently transcribed by the computer software. Please disregard these errors. Please excuse any errors that have escaped final proofreading. Thank you.

## 2021-01-14 ENCOUNTER — OFFICE VISIT (OUTPATIENT)
Dept: UROLOGY | Age: 66
End: 2021-01-14
Payer: MEDICARE

## 2021-01-14 VITALS — BODY MASS INDEX: 28.14 KG/M2 | WEIGHT: 190 LBS | HEIGHT: 69 IN | TEMPERATURE: 97.2 F

## 2021-01-14 DIAGNOSIS — R35.1 NOCTURIA: ICD-10-CM

## 2021-01-14 DIAGNOSIS — R39.14 BENIGN PROSTATIC HYPERPLASIA WITH INCOMPLETE BLADDER EMPTYING: Primary | ICD-10-CM

## 2021-01-14 DIAGNOSIS — N40.1 BENIGN PROSTATIC HYPERPLASIA WITH INCOMPLETE BLADDER EMPTYING: Primary | ICD-10-CM

## 2021-01-14 DIAGNOSIS — N50.3 EPIDIDYMAL CYST: ICD-10-CM

## 2021-01-14 DIAGNOSIS — N44.2 TESTICULAR CYST: ICD-10-CM

## 2021-01-14 DIAGNOSIS — R39.14 FEELING OF INCOMPLETE BLADDER EMPTYING: ICD-10-CM

## 2021-01-14 LAB
BILIRUB UR QL STRIP: NEGATIVE
GLUCOSE UR-MCNC: NEGATIVE MG/DL
KETONES P FAST UR STRIP-MCNC: NEGATIVE MG/DL
PH UR STRIP: 8 [PH] (ref 4.6–8)
PROT UR QL STRIP: NEGATIVE
PVR POC: 10 CC
SP GR UR STRIP: 1.02 (ref 1–1.03)
UA UROBILINOGEN AMB POC: NORMAL (ref 0.2–1)
URINALYSIS CLARITY POC: CLEAR
URINALYSIS COLOR POC: YELLOW
URINE BLOOD POC: NORMAL
URINE LEUKOCYTES POC: NEGATIVE
URINE NITRITES POC: NEGATIVE

## 2021-01-14 PROCEDURE — 81003 URINALYSIS AUTO W/O SCOPE: CPT | Performed by: UROLOGY

## 2021-01-14 PROCEDURE — 51798 US URINE CAPACITY MEASURE: CPT | Performed by: UROLOGY

## 2021-01-14 PROCEDURE — 99203 OFFICE O/P NEW LOW 30 MIN: CPT | Performed by: UROLOGY

## 2021-01-14 RX ORDER — TAMSULOSIN HYDROCHLORIDE 0.4 MG/1
0.4 CAPSULE ORAL DAILY
Qty: 30 CAP | Refills: 3 | Status: SHIPPED | OUTPATIENT
Start: 2021-01-14 | End: 2021-02-13

## 2021-01-14 RX ORDER — ASPIRIN 81 MG/1
TABLET ORAL DAILY
COMMUNITY
End: 2022-01-14 | Stop reason: ALTCHOICE

## 2021-01-14 NOTE — PROGRESS NOTES
HISTORY OF PRESENT ILLNESS  Jessica Walden Friday is a 77 y.o. male. Chief Complaint   Patient presents with    New Patient    Prostatitis     He is here to establish care and have his prostate checked. He reports that he has had PSA's done in the past with \"good\" results. He has them checked at the [place]. No history of prostate cancer in the family. IPPS today is 12/2. He tells me he often feels that he cannot empty his bladder and double voids. His PVR today is 10cc. He also complains of frequency, urinating 5-6 times/day and is up 3 times/night, every night. However, in the last 1 hour here, he has voided 3 times. He drinks lots fluids each day. He has minimal intermittency and weak stream.  He has never been on any medications for his symptoms. He has a history of testicular and epididymal cyts; not bothersome to him. He does tell me he has some mildly bothersome scrotal pruritis. He denies dysuria, hematuria, or incontinence. He does not have problems with constipation. Chronic Conditions Addressed Today     1. Testicular cyst     Overview      US 2019: left testicle is normal in size with normal color-flow. There is an incidental 4 mm cyst.          Current Assessment & Plan      No concerns on exam         2. Epididymal cyst     Overview      US 7/2019: (right sided) several cysts are present in the epididymis with the largest measuring 6 mm. Otherwise unremarkable. Current Assessment & Plan      No change, not concerning         3. Benign prostatic hyperplasia with incomplete bladder emptying - Primary     Overview      He feels he double voids and does not empty. Current Assessment & Plan      He is not bothered. Relevant Medications     tamsulosin (FLOMAX) 0.4 mg capsule     Other Relevant Orders     AMB POC URINALYSIS DIP STICK AUTO W/O MICRO     AMB POC PVR, ANDRE,POST-VOID RES,US,NON-IMAGING     PSA W/ REFLX FREE PSA     URINALYSIS W/MICROSCOPIC    4. Feeling of incomplete bladder emptying     Overview      He feels that he cannot empty and often double voids. PVR today 10cc. Current Assessment & Plan      No objective concerns. He is not symptomatically bothered. High fluid intake may make him void more. Relevant Medications     tamsulosin (FLOMAX) 0.4 mg capsule     Other Relevant Orders     AMB POC URINALYSIS DIP STICK AUTO W/O MICRO     AMB POC PVR, ANDRE,POST-VOID RES,US,NON-IMAGING     URINALYSIS W/MICROSCOPIC    5. Nocturia     Overview      He is up 3 times/night. Current Assessment & Plan      Partially fluid dependent. He is not bothered. Review of Systems   Gastrointestinal:        Per HPI   Genitourinary:        Per HPI   All other systems reviewed and are negative. Past Medical History:   Diagnosis Date    Hypercholesterolemia     Hypertension       History reviewed. No pertinent surgical history. History reviewed. No pertinent family history. Physical Exam  Vitals signs reviewed. Constitutional:       General: He is not in acute distress. Appearance: Normal appearance. HENT:      Head: Normocephalic and atraumatic. Eyes:      Extraocular Movements: Extraocular movements intact. Conjunctiva/sclera: Conjunctivae normal.      Pupils: Pupils are equal, round, and reactive to light. Neck:      Musculoskeletal: Normal range of motion. Cardiovascular:      Rate and Rhythm: Normal rate and regular rhythm. Pulmonary:      Effort: Pulmonary effort is normal. No respiratory distress. Breath sounds: Normal breath sounds. No wheezing or rhonchi. Abdominal:      General: There is no distension. Palpations: Abdomen is soft. Genitourinary:     Penis: Normal. No phimosis, hypospadias or tenderness. Testes: Normal.         Right: Mass, tenderness or swelling not present. Left: Mass, tenderness or swelling not present.       Epididymis:      Right: Normal. No mass or tenderness. Left: Normal. No mass or tenderness. Prostate: Enlarged (1+). Not tender and no nodules present. Rectum: Normal.   Musculoskeletal: Normal range of motion. Lymphadenopathy:      Cervical: No cervical adenopathy. Upper Body:      Right upper body: No supraclavicular adenopathy. Left upper body: No supraclavicular adenopathy. Skin:     General: Skin is warm and dry. Neurological:      General: No focal deficit present. Mental Status: He is alert and oriented to person, place, and time. Mental status is at baseline. Psychiatric:         Mood and Affect: Mood normal.         Behavior: Behavior normal.                     ASSESSMENT and PLAN  Diagnoses and all orders for this visit:    1. Benign prostatic hyperplasia with incomplete bladder emptying  Assessment & Plan:  He is not bothered. Orders:  -     AMB POC URINALYSIS DIP STICK AUTO W/O MICRO  -     AMB POC PVR, ANDRE,POST-VOID RES,US,NON-IMAGING  -     PSA W/ REFLX FREE PSA; Future  -     tamsulosin (FLOMAX) 0.4 mg capsule; Take 1 Cap by mouth daily for 30 days.  -     URINALYSIS W/MICROSCOPIC    2. Feeling of incomplete bladder emptying  Assessment & Plan:  No objective concerns. He is not symptomatically bothered. High fluid intake may make him void more. Orders:  -     AMB POC URINALYSIS DIP STICK AUTO W/O MICRO  -     AMB POC PVR, ANDRE,POST-VOID RES,US,NON-IMAGING  -     URINALYSIS W/MICROSCOPIC    3. Nocturia  Assessment & Plan:  Partially fluid dependent. He is not bothered. 4. Testicular cyst  Assessment & Plan:  No concerns on exam      5. Epididymal cyst  Assessment & Plan:  No change, not concerning           Follow-up and Dispositions    · Return in about 1 year (around 1/14/2022).          Christina Dorantes MD

## 2021-01-14 NOTE — ASSESSMENT & PLAN NOTE
No objective concerns. He is not symptomatically bothered. High fluid intake may make him void more.

## 2021-01-15 LAB
APPEARANCE UR: CLEAR
BACTERIA #/AREA URNS HPF: NORMAL /[HPF]
BILIRUB UR QL STRIP: NEGATIVE
CASTS URNS QL MICRO: NORMAL /LPF
COLOR UR: YELLOW
EPI CELLS #/AREA URNS HPF: NORMAL /HPF (ref 0–10)
GLUCOSE UR QL: NEGATIVE
HGB UR QL STRIP: NEGATIVE
KETONES UR QL STRIP: NEGATIVE
LEUKOCYTE ESTERASE UR QL STRIP: NEGATIVE
MICRO URNS: ABNORMAL
MICRO URNS: ABNORMAL
NITRITE UR QL STRIP: NEGATIVE
PH UR STRIP: 8 [PH] (ref 5–7.5)
PROT UR QL STRIP: NEGATIVE
RBC #/AREA URNS HPF: NORMAL /HPF (ref 0–2)
SP GR UR: 1.01 (ref 1–1.03)
UROBILINOGEN UR STRIP-MCNC: 0.2 MG/DL (ref 0.2–1)
WBC #/AREA URNS HPF: NORMAL /HPF (ref 0–5)

## 2021-07-28 ENCOUNTER — HOSPITAL ENCOUNTER (OUTPATIENT)
Age: 66
Setting detail: OUTPATIENT SURGERY
Discharge: HOME | End: 2021-07-28
Attending: SPECIALIST | Admitting: SPECIALIST
Payer: MEDICARE

## 2021-07-28 ENCOUNTER — ANESTHESIA EVENT (OUTPATIENT)
Dept: ENDOSCOPY | Age: 66
End: 2021-07-28
Payer: MEDICARE

## 2021-07-28 ENCOUNTER — ANESTHESIA (OUTPATIENT)
Dept: ENDOSCOPY | Age: 66
End: 2021-07-28
Payer: MEDICARE

## 2021-07-28 VITALS
OXYGEN SATURATION: 100 % | BODY MASS INDEX: 28.8 KG/M2 | RESPIRATION RATE: 15 BRPM | TEMPERATURE: 98.6 F | HEIGHT: 69 IN | WEIGHT: 194.45 LBS | DIASTOLIC BLOOD PRESSURE: 93 MMHG | HEART RATE: 60 BPM | SYSTOLIC BLOOD PRESSURE: 155 MMHG

## 2021-07-28 PROCEDURE — 76060000031 HC ANESTHESIA FIRST 0.5 HR: Performed by: SPECIALIST

## 2021-07-28 PROCEDURE — 74011000250 HC RX REV CODE- 250: Performed by: REGISTERED NURSE

## 2021-07-28 PROCEDURE — 76040000019: Performed by: SPECIALIST

## 2021-07-28 PROCEDURE — 88305 TISSUE EXAM BY PATHOLOGIST: CPT

## 2021-07-28 PROCEDURE — 2709999900 HC NON-CHARGEABLE SUPPLY: Performed by: SPECIALIST

## 2021-07-28 PROCEDURE — 77030013992 HC SNR POLYP ENDOSC BSC -B: Performed by: SPECIALIST

## 2021-07-28 PROCEDURE — 74011250637 HC RX REV CODE- 250/637: Performed by: SPECIALIST

## 2021-07-28 PROCEDURE — 74011250636 HC RX REV CODE- 250/636: Performed by: SPECIALIST

## 2021-07-28 PROCEDURE — 74011250636 HC RX REV CODE- 250/636: Performed by: REGISTERED NURSE

## 2021-07-28 RX ORDER — LIDOCAINE HYDROCHLORIDE 20 MG/ML
INJECTION, SOLUTION EPIDURAL; INFILTRATION; INTRACAUDAL; PERINEURAL AS NEEDED
Status: DISCONTINUED | OUTPATIENT
Start: 2021-07-28 | End: 2021-07-28 | Stop reason: HOSPADM

## 2021-07-28 RX ORDER — FENTANYL CITRATE 50 UG/ML
25 INJECTION, SOLUTION INTRAMUSCULAR; INTRAVENOUS AS NEEDED
Status: DISCONTINUED | OUTPATIENT
Start: 2021-07-28 | End: 2021-07-28 | Stop reason: HOSPADM

## 2021-07-28 RX ORDER — FLUMAZENIL 0.1 MG/ML
0.2 INJECTION INTRAVENOUS
Status: DISCONTINUED | OUTPATIENT
Start: 2021-07-28 | End: 2021-07-28 | Stop reason: HOSPADM

## 2021-07-28 RX ORDER — NALOXONE HYDROCHLORIDE 0.4 MG/ML
0.4 INJECTION, SOLUTION INTRAMUSCULAR; INTRAVENOUS; SUBCUTANEOUS
Status: DISCONTINUED | OUTPATIENT
Start: 2021-07-28 | End: 2021-07-28 | Stop reason: HOSPADM

## 2021-07-28 RX ORDER — DEXTROMETHORPHAN/PSEUDOEPHED 2.5-7.5/.8
1.2 DROPS ORAL
Status: DISCONTINUED | OUTPATIENT
Start: 2021-07-28 | End: 2021-07-28 | Stop reason: HOSPADM

## 2021-07-28 RX ORDER — SODIUM CHLORIDE 9 MG/ML
50 INJECTION, SOLUTION INTRAVENOUS CONTINUOUS
Status: DISCONTINUED | OUTPATIENT
Start: 2021-07-28 | End: 2021-07-28 | Stop reason: HOSPADM

## 2021-07-28 RX ORDER — PROPOFOL 10 MG/ML
INJECTION, EMULSION INTRAVENOUS AS NEEDED
Status: DISCONTINUED | OUTPATIENT
Start: 2021-07-28 | End: 2021-07-28 | Stop reason: HOSPADM

## 2021-07-28 RX ORDER — MIDAZOLAM HYDROCHLORIDE 1 MG/ML
.25-5 INJECTION, SOLUTION INTRAMUSCULAR; INTRAVENOUS AS NEEDED
Status: DISCONTINUED | OUTPATIENT
Start: 2021-07-28 | End: 2021-07-28 | Stop reason: HOSPADM

## 2021-07-28 RX ORDER — SODIUM CHLORIDE 9 MG/ML
INJECTION, SOLUTION INTRAVENOUS
Status: DISCONTINUED | OUTPATIENT
Start: 2021-07-28 | End: 2021-07-28 | Stop reason: HOSPADM

## 2021-07-28 RX ADMIN — PROPOFOL INJECTABLE EMULSION 20 MG: 10 INJECTION, EMULSION INTRAVENOUS at 09:32

## 2021-07-28 RX ADMIN — PROPOFOL INJECTABLE EMULSION 20 MG: 10 INJECTION, EMULSION INTRAVENOUS at 09:38

## 2021-07-28 RX ADMIN — PROPOFOL INJECTABLE EMULSION 20 MG: 10 INJECTION, EMULSION INTRAVENOUS at 09:36

## 2021-07-28 RX ADMIN — PROPOFOL INJECTABLE EMULSION 80 MG: 10 INJECTION, EMULSION INTRAVENOUS at 09:30

## 2021-07-28 RX ADMIN — SODIUM CHLORIDE 50 ML/HR: 9 INJECTION, SOLUTION INTRAVENOUS at 08:36

## 2021-07-28 RX ADMIN — LIDOCAINE HYDROCHLORIDE 100 MG: 20 INJECTION, SOLUTION INTRAVENOUS at 09:30

## 2021-07-28 RX ADMIN — PROPOFOL INJECTABLE EMULSION 20 MG: 10 INJECTION, EMULSION INTRAVENOUS at 09:34

## 2021-07-28 RX ADMIN — SIMETHICONE 80 MG: 20 SUSPENSION/ DROPS ORAL at 09:34

## 2021-07-28 RX ADMIN — SODIUM CHLORIDE: 9 INJECTION, SOLUTION INTRAVENOUS at 09:30

## 2021-07-28 RX ADMIN — PROPOFOL INJECTABLE EMULSION 10 MG: 10 INJECTION, EMULSION INTRAVENOUS at 09:40

## 2021-07-28 NOTE — PERIOP NOTES
Received report from Caity Jordan CRNA. See anesthesia record. Patient taken to post-recovery. Post-recovery report given to [facility]. Patient's ABD remains soft and non-tender post procedure. Pt has no complaints at this time and tolerated the procedure well. Endoscope was pre-cleaned at bedside immediately following procedure by Scott.

## 2021-07-28 NOTE — INTERVAL H&P NOTE
Pre-Endoscopy H&P Update  Chief complaint/HPI/ROS:  The indication for the procedure, the patient's history and the patient's current medications are reviewed prior to the procedure and that data is reported on the H&P to which this document is attached. Any significant complaints with regard to organ systems will be noted, and if not mentioned then a review of systems is not contributory. Past Medical History:   Diagnosis Date    Hypercholesterolemia     Hypertension     Sleep apnea     uses cpap at home. History reviewed. No pertinent surgical history. Social   Social History     Tobacco Use    Smoking status: Never Smoker    Smokeless tobacco: Never Used   Substance Use Topics    Alcohol use: Not Currently      History reviewed. No pertinent family history. Allergies   Allergen Reactions    Pcn [Penicillins] Hives      Prior to Admission Medications   Prescriptions Last Dose Informant Patient Reported? Taking? Cholecalciferol, Vitamin D3, 3,000 unit tab Not Taking at Unknown time  Yes No   Sig: Take  by mouth. Patient not taking: Reported on 7/28/2021   CATALINA ROOT, BULK, Not Taking at Unknown time  Yes No   Sig: by Does Not Apply route. Patient not taking: Reported on 7/28/2021   aspirin delayed-release 81 mg tablet Not Taking at Unknown time  Yes No   Sig: Take  by mouth daily. Patient not taking: Reported on 7/28/2021   simvastatin (ZOCOR) 80 mg tablet Not Taking at Unknown time  Yes No   Sig: Take 80 mg by mouth nightly. Patient not taking: Reported on 7/28/2021      Facility-Administered Medications: None       PHYSICAL EXAM:  The patient is examined immediately prior to the procedure. Visit Vitals  BP (!) 169/76   Pulse (!) 59   Temp 98.1 °F (36.7 °C)   Resp 15   Ht 5' 9\" (1.753 m)   Wt 88.2 kg (194 lb 7.1 oz)   SpO2 98%   BMI 28.71 kg/m²     Gen: Appears comfortable, no distress.   Pulm: comfortable respirations with no abnormal audible breath sounds  HEART: well perfused, no abnormal audible heart sounds  GI: abdomen flat. PLAN:  Informed consent discussion held, patient afforded an opportunity to ask questions and all questions answered. After being advised of the risks, benefits, and alternatives, the patient requested that we proceed and indicated so on a written consent form. Will proceed with procedure as planned.   Raj Gonzalez MD

## 2021-07-28 NOTE — ANESTHESIA PREPROCEDURE EVALUATION
Relevant Problems   No relevant active problems       Anesthetic History   No history of anesthetic complications            Review of Systems / Medical History  Patient summary reviewed, nursing notes reviewed and pertinent labs reviewed    Pulmonary        Sleep apnea: CPAP           Neuro/Psych   Within defined limits           Cardiovascular    Hypertension: well controlled          Hyperlipidemia    Exercise tolerance: >4 METS     GI/Hepatic/Renal  Within defined limits              Endo/Other  Within defined limits           Other Findings              Physical Exam    Airway  Mallampati: II  TM Distance: 4 - 6 cm  Neck ROM: normal range of motion   Mouth opening: Normal     Cardiovascular    Rhythm: regular  Rate: normal         Dental    Dentition: Full lower dentures and Full upper dentures     Pulmonary  Breath sounds clear to auscultation               Abdominal  GI exam deferred       Other Findings            Anesthetic Plan    ASA: 2  Anesthesia type: MAC          Induction: Intravenous  Anesthetic plan and risks discussed with: Patient

## 2021-07-28 NOTE — ANESTHESIA POSTPROCEDURE EVALUATION
Procedure(s):  COLONOSCOPY  ENDOSCOPIC POLYPECTOMY. MAC    Anesthesia Post Evaluation        Patient location during evaluation: bedside  Patient participation: complete - patient participated  Level of consciousness: awake and alert  Pain management: adequate  Airway patency: patent  Anesthetic complications: no  Cardiovascular status: acceptable  Respiratory status: acceptable  Hydration status: acceptable  Post anesthesia nausea and vomiting:  none  Final Post Anesthesia Temperature Assessment:  Normothermia (36.0-37.5 degrees C)      INITIAL Post-op Vital signs:   Vitals Value Taken Time   /66 07/28/21 1028   Temp 37 °C (98.6 °F) 07/28/21 0949   Pulse 66 07/28/21 1029   Resp 11 07/28/21 1029   SpO2 94 % 07/28/21 1029   Vitals shown include unvalidated device data.

## 2021-07-28 NOTE — DISCHARGE INSTRUCTIONS
[address] [facility] SERGE Hampton MD  [phone]      July 28, 2021    Yasmin Hamilton Friday  YOB: 1955    COLONOSCOPY DISCHARGE INSTRUCTIONS    If there is redness at IV site you should apply warm compress to area. If redness or soreness persist contact Dr. Faizan Hampton' or your primary care doctor. There may be a slight amount of blood passed from the rectum. Gaseous discomfort may develop, but walking, belching will help relieve this. You may not operate a vehicle for 12 hours  You may not operate machinery or dangerous appliances for rest of today  You may not drink alcoholic beverages for 12 hours  Avoid making any critical decisions for 24 hours    DIET:  You may resume your normal diet, but some patients find that heavy or large meals may lead to indigestion or vomiting. I suggest a light meal as first food intake. MEDICATIONS:  The use of some over-the-counter pain medication may lead to bleeding after colon biopsies or polyp removal.  Tylenol (also called acetaminophen) is safe to take even if you have had colonoscopy with polyp removal.  Based on the procedure you had today you may not safely take aspirin or aspirin-like products for the next ten (10) days. Remember that Tylenol (also called acetaminophen) is safe to take after colonoscopy even if you have had biopsies or polyps removed. ACTIVITY:  You may resume your normal household activities, but it is recommended that you spend the remainder of the day resting -  avoid any strenuous activity. CALL DR. Donald Sánchez' OFFICE IF:  Increasing pain, nausea, vomiting  Abdominal distension (swelling)  Significant new or increased bleeding (oral or rectal)  Fever/Chills  Chest pain/shortness of breath                       Additional instructions:   Two small polyps removed today, but no colon cancer. I'll send you a letter with the polyp results in 7-10 days. It was an honor to be your doctor today. Please let me or my office staff know if you have any feedback about today's procedure. Deisi Prince MD    Colonoscopy saves lives, and can prevent colon cancer. Everyone aged 48 or older needs colonoscopy.   Tell your family and friends: get the test!

## 2021-07-28 NOTE — PROGRESS NOTES
Renetta Mcdonaldolph Friday 1955  [id number]    Situation:  Verbal report received from: Sydney Palumbo RN   Procedure: Procedure(s):  COLONOSCOPY  ENDOSCOPIC POLYPECTOMY    Background:    Preoperative diagnosis: PERSONAL HISTORY POLYPS  Postoperative diagnosis: 1- Ascending colon polyp. 2- Diverticulosis    :  Dr. Jermaine Johnston  Assistant(s): Endoscopy Technician-1: Luis M Magallanes  Endoscopy RN-1: Ed Tompkins    Specimens:   ID Type Source Tests Collected by Time Destination   1 : Ascending Colon Polyp Preservative   Rebecca Kearney MD 7/28/2021 0935 Pathology     H. Pylori  no    Assessment:    Anesthesia gave intra-procedure sedation and medications, see anesthesia flow sheet no    Intravenous fluids: NS@ KVO     Vital signs stable     Abdominal assessment: round and soft     Recommendation:  Discharge patient per MD order.   Return to floor  Family or Friend   Permission to share finding with family or friend yes

## 2021-07-28 NOTE — H&P
74 y.o. male for open access colonoscopy for screening   Additional data for completion of the targeted pre-endoscopy H&P will be provided under 'H&P interval notes'. Please see that document which will be attached to this.   Jamilah Malcolm MD

## 2021-07-28 NOTE — PROGRESS NOTES
Endoscopy discharge instructions have been reviewed and given to patient. The patient verbalized understanding and acceptance of instructions. Dr. Natalie Tadeo discussed with patient procedure findings and next steps.

## 2021-07-28 NOTE — PROCEDURES
[address] [facility] SERGE Leonardo MD  [phone]      2021    Colonoscopy Procedure Note  Krish Ryedwar Friday  :  1955  [facility] Medical Record Number: [id number]    Indications:     Screening colonoscopy  PCP:  Ekta Owen MD  Anesthesia/Sedation: Conscious Sedation/Moderate Sedation/GETA, see notes  Endoscopist:  Dr. Audrey Hassan  Complications:  None  Estimated Blood Loss:  None    Permit:  The indications, risks, benefits and alternatives were reviewed with the patient or their decision maker who was provided an opportunity to ask questions and all questions were answered. The specific risks of colonoscopy with conscious sedation were reviewed, including but not limited to anesthetic complication, bleeding, adverse drug reaction, missed lesion, infection, IV site reactions, and intestinal perforation which would lead to the need for surgical repair. Alternatives to colonoscopy including radiographic imaging, observation without testing, or laboratory testing were reviewed including the limitations of those alternatives. After considering the options and having all their questions answered, the patient or their decision maker provided both verbal and written consent to proceed. Procedure in Detail:  After obtaining informed consent, positioning of the patient in the left lateral decubitus position, and conduction of a pre-procedure pause or \"time out\" the endoscope was introduced into the anus and advanced to the cecum, which was identified by the ileocecal valve and appendiceal orifice. The quality of the colonic preparation was good. A careful inspection was made as the colonoscope was withdrawn, findings and interventions are described below.     Findings:   Two small polyps ascending colon 4mm 4mm; cold snare removal with complete hemostasis and retrieval.  There is diverticulosis in the sigmoid colon without complications such as bleeding, inflammatory change, or luminal narrowing. Specimens:    See above    Complications:   None; patient tolerated the procedure well. Impression:  Colon polyps (two), and diverticulosis. Recommendations:     - Await pathology. Thank you for entrusting me with this patient's care. Please do not hesitate to contact me with any questions or if I can be of assistance with any of your other patients' GI needs. Signed By: Piyush Soriano MD                        July 28, 2021      Surgical assistant none. Implants none unless specified.

## 2022-01-03 DIAGNOSIS — R39.14 BENIGN PROSTATIC HYPERPLASIA WITH INCOMPLETE BLADDER EMPTYING: ICD-10-CM

## 2022-01-03 DIAGNOSIS — N40.1 BENIGN PROSTATIC HYPERPLASIA WITH INCOMPLETE BLADDER EMPTYING: ICD-10-CM

## 2022-01-10 ENCOUNTER — TELEPHONE (OUTPATIENT)
Dept: UROLOGY | Age: 67
End: 2022-01-10

## 2022-01-12 PROBLEM — Z12.5 PROSTATE CANCER SCREENING: Status: ACTIVE | Noted: 2022-01-12

## 2022-01-12 LAB
PSA SERPL-MCNC: 3.4 NG/ML (ref 0–4)
REFLEX CRITERIA: NORMAL

## 2022-01-14 ENCOUNTER — OFFICE VISIT (OUTPATIENT)
Dept: UROLOGY | Age: 67
End: 2022-01-14
Payer: MEDICARE

## 2022-01-14 VITALS — HEIGHT: 69 IN | BODY MASS INDEX: 28.88 KG/M2 | WEIGHT: 195 LBS

## 2022-01-14 DIAGNOSIS — R35.1 NOCTURIA: ICD-10-CM

## 2022-01-14 DIAGNOSIS — Z12.5 PROSTATE CANCER SCREENING: ICD-10-CM

## 2022-01-14 DIAGNOSIS — R39.14 BENIGN PROSTATIC HYPERPLASIA WITH INCOMPLETE BLADDER EMPTYING: Primary | ICD-10-CM

## 2022-01-14 DIAGNOSIS — N40.1 BENIGN PROSTATIC HYPERPLASIA WITH INCOMPLETE BLADDER EMPTYING: Primary | ICD-10-CM

## 2022-01-14 DIAGNOSIS — R39.14 FEELING OF INCOMPLETE BLADDER EMPTYING: ICD-10-CM

## 2022-01-14 LAB
BILIRUB UR QL STRIP: NEGATIVE
GLUCOSE UR-MCNC: NEGATIVE MG/DL
KETONES P FAST UR STRIP-MCNC: NEGATIVE MG/DL
PH UR STRIP: 7.5 [PH] (ref 4.6–8)
PROT UR QL STRIP: NEGATIVE
SP GR UR STRIP: 1.01 (ref 1–1.03)
UA UROBILINOGEN AMB POC: NORMAL (ref 0.2–1)
URINALYSIS CLARITY POC: CLEAR
URINALYSIS COLOR POC: YELLOW
URINE BLOOD POC: NORMAL
URINE LEUKOCYTES POC: NEGATIVE
URINE NITRITES POC: NEGATIVE

## 2022-01-14 PROCEDURE — 1101F PT FALLS ASSESS-DOCD LE1/YR: CPT | Performed by: UROLOGY

## 2022-01-14 PROCEDURE — G8432 DEP SCR NOT DOC, RNG: HCPCS | Performed by: UROLOGY

## 2022-01-14 PROCEDURE — 99214 OFFICE O/P EST MOD 30 MIN: CPT | Performed by: UROLOGY

## 2022-01-14 PROCEDURE — G8536 NO DOC ELDER MAL SCRN: HCPCS | Performed by: UROLOGY

## 2022-01-14 PROCEDURE — G8427 DOCREV CUR MEDS BY ELIG CLIN: HCPCS | Performed by: UROLOGY

## 2022-01-14 PROCEDURE — 81003 URINALYSIS AUTO W/O SCOPE: CPT | Performed by: UROLOGY

## 2022-01-14 PROCEDURE — 3017F COLORECTAL CA SCREEN DOC REV: CPT | Performed by: UROLOGY

## 2022-01-14 PROCEDURE — G8419 CALC BMI OUT NRM PARAM NOF/U: HCPCS | Performed by: UROLOGY

## 2022-01-14 NOTE — PROGRESS NOTES
HISTORY OF PRESENT ILLNESS  Pardeep Dumont Friday is a 74 y.o. male. Chief Complaint   Patient presents with    Follow-up    Benign Prostatic Hypertrophy    Nocturia    Incomplete Bladder Emptying    Testicular Cyst     Past Medical History:  PMHx (including negatives):  has a past medical history of Hypercholesterolemia, Hypertension, and Sleep apnea. PSurgHx:  has a past surgical history that includes colonoscopy (N/A, 7/28/2021). PSocHx:  reports that he has never smoked. He has never used smokeless tobacco. He reports previous alcohol use. He reports that he does not use drugs. He is here for annual follow up. He is followed for BPH and feelings of incomplete emptying and nocturia. PVR's have not been significant. PSA 1/11/22 was 3.4. We do not have additional values. He previously reported good results. Chronic Conditions Addressed Today     1. Testicular cyst     Overview      US 2019: left testicle is normal in size with normal color-flow. There is an incidental 4 mm cyst.  2021: no concerns on exam.         2. Epididymal cyst     Overview      US 7/2019: (right sided) several cysts are present in the epididymis with the largest measuring 6 mm. Otherwise unremarkable. 2021: no concerns on exam.         3. Benign prostatic hyperplasia with incomplete bladder emptying     Overview      2020: He feels he double voids and does not empty. 2021: not bothered. 4. Feeling of incomplete bladder emptying     Overview      He feels that he cannot empty and often double voids. PVR has not been significant. High fluid intake may make him void more. 5. Nocturia     Overview      2021: He is up 3 times/night. He is not bothered. Patient denies the symptoms of COVID-19 per routine screening guidelines. ROS    Past Medical History:  PMHx (including negatives):  has a past medical history of Hypercholesterolemia, Hypertension, and Sleep apnea.    PSurgHx:  has a past surgical history that includes colonoscopy (N/A, 7/28/2021). PSocHx:  reports that he has never smoked. He has never used smokeless tobacco. He reports previous alcohol use. He reports that he does not use drugs. Physical Exam  Abdominal:      Palpations: There is no mass. Tenderness: There is no abdominal tenderness. There is no guarding or rebound. Hernia: No hernia is present. Genitourinary:     Penis: Normal. No phimosis, hypospadias, tenderness or swelling. Testes: Normal.         Right: Mass, tenderness or swelling not present. Right testis is descended. Left: Mass, tenderness or swelling not present. Left testis is descended. Epididymis:      Right: Not inflamed or enlarged. No tenderness. Left: Not inflamed or enlarged. No tenderness. Prostate: Not enlarged (1+, benign), not tender and no nodules present. ASSESSMENT and PLAN  Diagnoses and all orders for this visit:    1. Benign prostatic hyperplasia with incomplete bladder emptying  Assessment & Plan:   He is not bothered by urination. Incomplete emptying. Orders:  -     AMB POC URINALYSIS DIP STICK AUTO W/O MICRO  -     PSA, DIAGNOSTIC (PROSTATE SPECIFIC AG); Future    2. Feeling of incomplete bladder emptying  Assessment & Plan:  He can double void but he is not bothered. Orders:  -     AMB POC URINALYSIS DIP STICK AUTO W/O MICRO    3. Nocturia  Assessment & Plan:   Improves with fluid restriction. UA clear without infection or hematuria    Orders:  -     AMB POC URINALYSIS DIP STICK AUTO W/O MICRO    4. Prostate cancer screening  Assessment & Plan:   His PSA is normal.  No concerns on exam.            Follow-up and Dispositions    · Return in about 1 year (around 1/14/2023) for PSA prior.          Mignon Farrar MD

## 2022-01-14 NOTE — PROGRESS NOTES
Chief Complaint   Patient presents with    Follow-up    Benign Prostatic Hypertrophy    Nocturia    Incomplete Bladder Emptying    Testicular Cyst     1. Have you been to the ER, urgent care clinic since your last visit? Hospitalized since your last visit? No    2. Have you seen or consulted any other health care providers outside of the [address] [place] [address] since your last visit? Include any pap smears or colon screening.  No  Visit Vitals  Ht 5' 9\" (1.753 m)   Wt 195 lb (88.5 kg)   BMI 28.80 kg/m²

## 2022-02-07 ENCOUNTER — APPOINTMENT (OUTPATIENT)
Dept: GENERAL RADIOLOGY | Age: 67
End: 2022-02-07
Attending: STUDENT IN AN ORGANIZED HEALTH CARE EDUCATION/TRAINING PROGRAM
Payer: MEDICARE

## 2022-02-07 ENCOUNTER — HOSPITAL ENCOUNTER (EMERGENCY)
Age: 67
Discharge: HOME | End: 2022-02-07
Attending: STUDENT IN AN ORGANIZED HEALTH CARE EDUCATION/TRAINING PROGRAM
Payer: MEDICARE

## 2022-02-07 VITALS
WEIGHT: 195 LBS | DIASTOLIC BLOOD PRESSURE: 70 MMHG | TEMPERATURE: 97.4 F | HEIGHT: 69 IN | BODY MASS INDEX: 28.88 KG/M2 | OXYGEN SATURATION: 99 % | SYSTOLIC BLOOD PRESSURE: 133 MMHG | RESPIRATION RATE: 16 BRPM | HEART RATE: 70 BPM

## 2022-02-07 DIAGNOSIS — R07.9 CHEST PAIN WITH LOW RISK OF ACUTE CORONARY SYNDROME: Primary | ICD-10-CM

## 2022-02-07 LAB
ALBUMIN SERPL-MCNC: 3.9 G/DL (ref 3.5–5)
ALBUMIN/GLOB SERPL: 1 {RATIO} (ref 1.1–2.2)
ALP SERPL-CCNC: 53 U/L (ref 45–117)
ALT SERPL-CCNC: 24 U/L (ref 12–78)
ANION GAP SERPL CALC-SCNC: 5 MMOL/L (ref 5–15)
APPEARANCE UR: CLEAR
AST SERPL-CCNC: 31 U/L (ref 15–37)
BACTERIA URNS QL MICRO: NEGATIVE /HPF
BASOPHILS # BLD: 0 K/UL (ref 0–0.1)
BASOPHILS NFR BLD: 1 % (ref 0–1)
BILIRUB SERPL-MCNC: 0.4 MG/DL (ref 0.2–1)
BILIRUB UR QL: NEGATIVE
BNP SERPL-MCNC: 73 PG/ML
BUN SERPL-MCNC: 13 MG/DL (ref 6–20)
BUN/CREAT SERPL: 12 (ref 12–20)
CALCIUM SERPL-MCNC: 9.1 MG/DL (ref 8.5–10.1)
CHLORIDE SERPL-SCNC: 107 MMOL/L (ref 97–108)
CO2 SERPL-SCNC: 26 MMOL/L (ref 21–32)
COLOR UR: ABNORMAL
COMMENT, HOLDF: NORMAL
CREAT SERPL-MCNC: 1.07 MG/DL (ref 0.7–1.3)
D DIMER PPP FEU-MCNC: 0.46 MG/L FEU (ref 0–0.65)
DIFFERENTIAL METHOD BLD: NORMAL
EOSINOPHIL # BLD: 0.1 K/UL (ref 0–0.4)
EOSINOPHIL NFR BLD: 1 % (ref 0–7)
EPITH CASTS URNS QL MICRO: ABNORMAL /LPF
ERYTHROCYTE [DISTWIDTH] IN BLOOD BY AUTOMATED COUNT: 13.2 % (ref 11.5–14.5)
GLOBULIN SER CALC-MCNC: 4.1 G/DL (ref 2–4)
GLUCOSE SERPL-MCNC: 91 MG/DL (ref 65–100)
GLUCOSE UR STRIP.AUTO-MCNC: NEGATIVE MG/DL
HCT VFR BLD AUTO: 43.8 % (ref 36.6–50.3)
HGB BLD-MCNC: 13.9 G/DL (ref 12.1–17)
HGB UR QL STRIP: NEGATIVE
HYALINE CASTS URNS QL MICRO: ABNORMAL /LPF (ref 0–5)
IMM GRANULOCYTES # BLD AUTO: 0 K/UL (ref 0–0.04)
IMM GRANULOCYTES NFR BLD AUTO: 0 % (ref 0–0.5)
KETONES UR QL STRIP.AUTO: ABNORMAL MG/DL
LEUKOCYTE ESTERASE UR QL STRIP.AUTO: NEGATIVE
LYMPHOCYTES # BLD: 1 K/UL (ref 0.8–3.5)
LYMPHOCYTES NFR BLD: 18 % (ref 12–49)
MCH RBC QN AUTO: 27 PG (ref 26–34)
MCHC RBC AUTO-ENTMCNC: 31.7 G/DL (ref 30–36.5)
MCV RBC AUTO: 85 FL (ref 80–99)
MONOCYTES # BLD: 0.6 K/UL (ref 0–1)
MONOCYTES NFR BLD: 12 % (ref 5–13)
NEUTS SEG # BLD: 3.8 K/UL (ref 1.8–8)
NEUTS SEG NFR BLD: 68 % (ref 32–75)
NITRITE UR QL STRIP.AUTO: NEGATIVE
NRBC # BLD: 0 K/UL (ref 0–0.01)
NRBC BLD-RTO: 0 PER 100 WBC
PH UR STRIP: 5.5 [PH] (ref 5–8)
PLATELET # BLD AUTO: 170 K/UL (ref 150–400)
PMV BLD AUTO: 11.8 FL (ref 8.9–12.9)
POTASSIUM SERPL-SCNC: 5.1 MMOL/L (ref 3.5–5.1)
PROT SERPL-MCNC: 8 G/DL (ref 6.4–8.2)
PROT UR STRIP-MCNC: NEGATIVE MG/DL
RBC # BLD AUTO: 5.15 M/UL (ref 4.1–5.7)
RBC #/AREA URNS HPF: ABNORMAL /HPF (ref 0–5)
SAMPLES BEING HELD,HOLD: NORMAL
SODIUM SERPL-SCNC: 138 MMOL/L (ref 136–145)
SP GR UR REFRACTOMETRY: 1.02 (ref 1–1.03)
TROPONIN-HIGH SENSITIVITY: 7 NG/L (ref 0–76)
UR CULT HOLD, URHOLD: NORMAL
UROBILINOGEN UR QL STRIP.AUTO: 0.2 EU/DL (ref 0.2–1)
WBC # BLD AUTO: 5.5 K/UL (ref 4.1–11.1)
WBC URNS QL MICRO: ABNORMAL /HPF (ref 0–4)

## 2022-02-07 PROCEDURE — 71046 X-RAY EXAM CHEST 2 VIEWS: CPT

## 2022-02-07 PROCEDURE — 83735 ASSAY OF MAGNESIUM: CPT

## 2022-02-07 PROCEDURE — 85379 FIBRIN DEGRADATION QUANT: CPT

## 2022-02-07 PROCEDURE — 81001 URINALYSIS AUTO W/SCOPE: CPT

## 2022-02-07 PROCEDURE — 84484 ASSAY OF TROPONIN QUANT: CPT

## 2022-02-07 PROCEDURE — 83036 HEMOGLOBIN GLYCOSYLATED A1C: CPT

## 2022-02-07 PROCEDURE — 80053 COMPREHEN METABOLIC PANEL: CPT

## 2022-02-07 PROCEDURE — 36415 COLL VENOUS BLD VENIPUNCTURE: CPT

## 2022-02-07 PROCEDURE — 99283 EMERGENCY DEPT VISIT LOW MDM: CPT

## 2022-02-07 PROCEDURE — 83880 ASSAY OF NATRIURETIC PEPTIDE: CPT

## 2022-02-07 PROCEDURE — 93005 ELECTROCARDIOGRAM TRACING: CPT

## 2022-02-07 PROCEDURE — 85025 COMPLETE CBC W/AUTO DIFF WBC: CPT

## 2022-02-07 NOTE — DISCHARGE INSTRUCTIONS
Your x-ray and lab work thankfully do not show pneumonia, evidence of heart disease or heart strain. If you continue have symptoms please follow-up with your primary care physician. They could not recommend other testing and consultations. If you have worsening pain or new concerning symptoms please do not hesitate to return.

## 2022-02-07 NOTE — ED TRIAGE NOTES
Patient arrives with c/o left sided headache, bilateral rib cage pain/numbness. Reports having intermittent left arm numbness/pain, and chest congestion. Denies arm numbness/pain in triage. States all sx present for past two. Reports testing positive for covid-19 3 weeks ago.

## 2022-02-07 NOTE — ED PROVIDER NOTES
Ev Larson Friday is a 69 y.o. male with past medical history notable for hypercholesterolemia, pretension, sleep apnea presenting with persistent cough and purulent mucus production. He had COVID-19 3 weeks ago. He had recently tested negative. He has recovered from his associated symptoms for the most part. He has had persistent fatigue however. He was concerned that he is having lateral chest discomfort with deep breathing overnight. Denies associated leg swelling. He denies exertional chest pressure, radiation of the chest discomfort outside the thorax. He has not had diaphoresis, persistent measured fevers but has had subjective fevers were occur overnight. He also notes increased urination. Notes that his vision was blurry last evening, took his blood pressure and was elevated, he has not been taking one of his blood pressure medications at home, took nicardipine and this morning his vision had returned to normal.  Denies dysuria, foul-smelling urine. Received initial 2 dose vaccine series for COVID but did not receive a booster dose. Past Medical History:   Diagnosis Date    Hypercholesterolemia     Hypertension     Sleep apnea     uses cpap at home. Past Surgical History:   Procedure Laterality Date    COLONOSCOPY N/A 7/28/2021    COLONOSCOPY performed by Bety Belcher MD at [facility]         No family history on file.     Social History     Socioeconomic History    Marital status:      Spouse name: Not on file    Number of children: Not on file    Years of education: Not on file    Highest education level: Not on file   Occupational History    Not on file   Tobacco Use    Smoking status: Never Smoker    Smokeless tobacco: Never Used   Vaping Use    Vaping Use: Never used   Substance and Sexual Activity    Alcohol use: Not Currently    Drug use: Never    Sexual activity: Not on file   Other Topics Concern    Not on file   Social History Narrative  Not on file     Social Determinants of Health     Financial Resource Strain:     Difficulty of Paying Living Expenses: Not on file   Food Insecurity:     Worried About Running Out of Food in the Last Year: Not on file    Taniya of Food in the Last Year: Not on file   Transportation Needs:     Lack of Transportation (Medical): Not on file    Lack of Transportation (Non-Medical): Not on file   Physical Activity:     Days of Exercise per Week: Not on file    Minutes of Exercise per Session: Not on file   Stress:     Feeling of Stress : Not on file   Social Connections:     Frequency of Communication with Friends and Family: Not on file    Frequency of Social Gatherings with Friends and Family: Not on file    Attends Worship Services: Not on file    Active Member of [address] [place] [address] or Organizations: Not on file    Attends Club or Organization Meetings: Not on file    Marital Status: Not on file   Intimate Partner Violence:     Fear of Current or Ex-Partner: Not on file    Emotionally Abused: Not on file    Physically Abused: Not on file    Sexually Abused: Not on file   Housing Stability:     Unable to Pay for Housing in the Last Year: Not on file    Number of [place] in the Last Year: Not on file    Unstable Housing in the Last Year: Not on file         ALLERGIES: Pcn [penicillins]    Review of Systems   Constitutional: Positive for fatigue and fever. Negative for chills. HENT: Negative for ear pain, sore throat and trouble swallowing. Eyes: Negative for visual disturbance. Respiratory: Positive for cough and shortness of breath. Cardiovascular: Negative for chest pain. Gastrointestinal: Negative for abdominal pain. Genitourinary: Negative for dysuria. Musculoskeletal: Negative for back pain. Skin: Negative for rash. Neurological: Positive for weakness. Negative for light-headedness and headaches. Psychiatric/Behavioral: Negative for confusion.    All other systems reviewed and are negative. Vitals:    22 1445 22 1447 22 1455 22 1733   BP:   (!) 153/75 133/70   Pulse:   71 70   Resp:   20 16   Temp:   97.7 °F (36.5 °C) 97.4 °F (36.3 °C)   SpO2: 98% 98% 98% 99%   Weight:   88.5 kg (195 lb)    Height:   5' 9\" (1.753 m)             Physical Exam  Vitals reviewed. Constitutional:       General: He is not in acute distress. Appearance: He is not toxic-appearing. HENT:      Head: Normocephalic and atraumatic. Mouth/Throat:      Mouth: Mucous membranes are moist.   Eyes:      Extraocular Movements: Extraocular movements intact. Cardiovascular:      Rate and Rhythm: Normal rate and regular rhythm. Heart sounds: Normal heart sounds. Pulmonary:      Effort: Pulmonary effort is normal. No respiratory distress. Breath sounds: Normal breath sounds. No wheezing. Abdominal:      Palpations: Abdomen is soft. Tenderness: There is no abdominal tenderness. Musculoskeletal:         General: Normal range of motion. Cervical back: Normal range of motion. Right lower leg: No edema. Left lower leg: No edema. Skin:     Capillary Refill: Capillary refill takes less than 2 seconds. Coloration: Skin is not jaundiced. Findings: No bruising. Neurological:      General: No focal deficit present. Mental Status: He is alert and oriented to person, place, and time.    Psychiatric:         Mood and Affect: Mood normal.          MDM  Number of Diagnoses or Management Options        MEDICAL DECISION MAKIN y.o. male presents with Rib Pain and Headache    Differential diagnosis includes but not limited to: Pleurisy, PE, pneumonia, pleural effusion, heart failure    LABORATORY TESTS:  Labs Reviewed   METABOLIC PANEL, COMPREHENSIVE - Abnormal; Notable for the following components:       Result Value    Globulin 4.1 (*)     A-G Ratio 1.0 (*)     All other components within normal limits   URINALYSIS W/MICROSCOPIC - Abnormal; Notable for the following components:    Ketone TRACE (*)     All other components within normal limits   URINE CULTURE HOLD SAMPLE   CBC WITH AUTOMATED DIFF   D DIMER   SAMPLES BEING HELD   MAGNESIUM   NT-PRO BNP   TROPONIN-HIGH SENSITIVITY   HEMOGLOBIN A1C WITH EAG       IMAGING RESULTS:  XR CHEST PA LAT   Final Result   Normal chest.          MEDICATIONS GIVEN:  Medications - No data to display    PROGRESS NOTE:   5:48 PM Patient's symptoms have improved after treatment    EKG:  Reviewed     CONSULTS:  Discussed with family at bedside    IMPRESSION:  1. Chest pain with low risk of acute coronary syndrome        PLAN:  -Patient feeling well now. No symptoms at present. All of his results are reassuring including troponin, D-dimer testing. X-ray is clear. Patient was updated verbally and provided written documentation. Discharge  Current Discharge Medication List        Follow-up Information    None       Return precautions given    Rogelio Bocanegra MD          Please note that this dictation was completed with [employer], the computer voice recognition software. Quite often unanticipated grammatical, syntax, homophones, and other interpretive errors are inadvertently transcribed by the computer software. Please disregard these errors. Please excuse any errors that have escaped final proofreading. ED Course as of 02/07/22 1751   Mon Feb 07, 2022   1600 Sinus bradycardia ventricular rate 59, normal axis, no ST elevation.   Likely normal EKG. [NS]      ED Course User Index  [NS] Letha Forte MD       Procedures

## 2022-02-08 LAB
ATRIAL RATE: 59 BPM
CALCULATED P AXIS, ECG09: 28 DEGREES
CALCULATED R AXIS, ECG10: -8 DEGREES
CALCULATED T AXIS, ECG11: 16 DEGREES
DIAGNOSIS, 93000: NORMAL
EST. AVERAGE GLUCOSE BLD GHB EST-MCNC: 120 MG/DL
HBA1C MFR BLD: 5.8 % (ref 4–5.6)
MAGNESIUM SERPL-MCNC: 1.9 MG/DL (ref 1.6–2.4)
P-R INTERVAL, ECG05: 166 MS
Q-T INTERVAL, ECG07: 376 MS
QRS DURATION, ECG06: 102 MS
QTC CALCULATION (BEZET), ECG08: 372 MS
VENTRICULAR RATE, ECG03: 59 BPM

## 2022-03-18 PROBLEM — R39.14 FEELING OF INCOMPLETE BLADDER EMPTYING: Status: ACTIVE | Noted: 2021-01-14

## 2022-03-19 PROBLEM — N44.2 TESTICULAR CYST: Status: ACTIVE | Noted: 2021-01-14

## 2022-03-19 PROBLEM — N40.1 BENIGN PROSTATIC HYPERPLASIA WITH INCOMPLETE BLADDER EMPTYING: Status: ACTIVE | Noted: 2021-01-14

## 2022-03-19 PROBLEM — R39.14 BENIGN PROSTATIC HYPERPLASIA WITH INCOMPLETE BLADDER EMPTYING: Status: ACTIVE | Noted: 2021-01-14

## 2022-03-19 PROBLEM — Z12.5 PROSTATE CANCER SCREENING: Status: ACTIVE | Noted: 2022-01-12

## 2022-03-19 PROBLEM — N50.3 EPIDIDYMAL CYST: Status: ACTIVE | Noted: 2021-01-14

## 2022-03-20 PROBLEM — R35.1 NOCTURIA: Status: ACTIVE | Noted: 2021-01-14

## 2022-10-04 ENCOUNTER — HOSPITAL ENCOUNTER (EMERGENCY)
Age: 67
Discharge: HOME | End: 2022-10-05
Attending: EMERGENCY MEDICINE
Payer: MEDICARE

## 2022-10-04 VITALS
DIASTOLIC BLOOD PRESSURE: 83 MMHG | RESPIRATION RATE: 16 BRPM | HEART RATE: 62 BPM | WEIGHT: 195 LBS | BODY MASS INDEX: 28.88 KG/M2 | SYSTOLIC BLOOD PRESSURE: 166 MMHG | TEMPERATURE: 97.9 F | OXYGEN SATURATION: 98 % | HEIGHT: 69 IN

## 2022-10-04 DIAGNOSIS — R04.0 EPISTAXIS: Primary | ICD-10-CM

## 2022-10-04 DIAGNOSIS — R51.9 NONINTRACTABLE HEADACHE, UNSPECIFIED CHRONICITY PATTERN, UNSPECIFIED HEADACHE TYPE: ICD-10-CM

## 2022-10-04 PROCEDURE — 99283 EMERGENCY DEPT VISIT LOW MDM: CPT

## 2022-10-04 PROCEDURE — 30901 CONTROL OF NOSEBLEED: CPT

## 2022-10-04 RX ORDER — LIDOCAINE HYDROCHLORIDE 20 MG/ML
15 SOLUTION OROPHARYNGEAL
Status: COMPLETED | OUTPATIENT
Start: 2022-10-04 | End: 2022-10-05

## 2022-10-04 RX ORDER — SILVER NITRATE 38.21; 12.74 MG/1; MG/1
1 STICK TOPICAL
Status: COMPLETED | OUTPATIENT
Start: 2022-10-04 | End: 2022-10-05

## 2022-10-04 RX ORDER — OXYMETAZOLINE HCL 0.05 %
2 SPRAY, NON-AEROSOL (ML) NASAL ONCE
Status: COMPLETED | OUTPATIENT
Start: 2022-10-04 | End: 2022-10-05

## 2022-10-04 RX ORDER — ACETAMINOPHEN 500 MG
1000 TABLET ORAL
Status: COMPLETED | OUTPATIENT
Start: 2022-10-04 | End: 2022-10-05

## 2022-10-05 PROCEDURE — 30901 CONTROL OF NOSEBLEED: CPT

## 2022-10-05 PROCEDURE — 74011000250 HC RX REV CODE- 250: Performed by: EMERGENCY MEDICINE

## 2022-10-05 PROCEDURE — 74011250637 HC RX REV CODE- 250/637: Performed by: EMERGENCY MEDICINE

## 2022-10-05 RX ADMIN — OXYMETAZOLINE HYDROCHLORIDE 2 SPRAY: 0.05 SPRAY, METERED NASAL at 00:41

## 2022-10-05 RX ADMIN — ACETAMINOPHEN 1000 MG: 500 TABLET ORAL at 00:41

## 2022-10-05 RX ADMIN — SILVER NITRATE 1 APPLICATOR: 38.21; 12.74 STICK TOPICAL at 00:41

## 2022-10-05 RX ADMIN — LIDOCAINE HYDROCHLORIDE 15 ML: 20 SOLUTION OROPHARYNGEAL at 01:17

## 2022-10-05 NOTE — ED PROVIDER NOTES
Pt with headache and nose bleed off and on that began 2 days ago    18-year-old male presenting ER with report of right-sided headache and a nosebleed that has had on and off for the last 2 days. Denies any blood thinners. Patient reports mild right-sided headache with no numbness any weakness in extremities no blurry vision or photophobia or loss of vision. No ringing in the ears. No nausea or vomiting. Patient has not take any medications for the headache. Denies any trauma or injuries. Patient reports nosebleed on and off for last 2 days that seems to be worsened after using his BiPAP. Denies any history of recurrent nosebleeds. Bleeding seems to be self resolving. Came in more for evaluation of the nosebleed. Past Medical History:   Diagnosis Date    Hypercholesterolemia     Hypertension     Sleep apnea     uses cpap at home. Past Surgical History:   Procedure Laterality Date    COLONOSCOPY N/A 7/28/2021    COLONOSCOPY performed by Lonnie Watson MD at [facility]         No family history on file.     Social History     Socioeconomic History    Marital status:      Spouse name: Not on file    Number of children: Not on file    Years of education: Not on file    Highest education level: Not on file   Occupational History    Not on file   Tobacco Use    Smoking status: Never    Smokeless tobacco: Never   Vaping Use    Vaping Use: Never used   Substance and Sexual Activity    Alcohol use: Not Currently    Drug use: Never    Sexual activity: Not on file   Other Topics Concern    Not on file   Social History Narrative    Not on file     Social Determinants of Health     Financial Resource Strain: Not on file   Food Insecurity: Not on file   Transportation Needs: Not on file   Physical Activity: Not on file   Stress: Not on file   Social Connections: Not on file   Intimate Partner Violence: Not on file   Housing Stability: Not on file         ALLERGIES: Pcn [penicillins]    Review of Systems   Constitutional:  Negative for chills and fever. HENT:  Positive for nosebleeds. Negative for congestion and sore throat. Eyes:  Negative for pain. Respiratory:  Negative for shortness of breath. Cardiovascular:  Negative for chest pain. Gastrointestinal:  Negative for abdominal pain, diarrhea, nausea and vomiting. Genitourinary:  Negative for dysuria and flank pain. Musculoskeletal:  Negative for back pain and neck pain. Skin:  Negative for rash. Neurological:  Positive for headaches. Negative for dizziness, tremors, seizures, syncope, facial asymmetry, speech difficulty, weakness, light-headedness and numbness. All other systems reviewed and are negative. Vitals:    10/04/22 2257   BP: (!) 166/83   Pulse: 62   Resp: 16   Temp: 97.9 °F (36.6 °C)   SpO2: 98%   Weight: 88.5 kg (195 lb)   Height: 5' 9\" (1.753 m)            Physical Exam  Constitutional:       Appearance: He is well-developed. HENT:      Head: Normocephalic. Nose:      Right Nostril: Epistaxis (resolved) present. Comments: Patient has evidence of previous bleeding stigmata in the right nare septal side  Eyes:      Conjunctiva/sclera: Conjunctivae normal.   Cardiovascular:      Rate and Rhythm: Normal rate and regular rhythm. Pulmonary:      Effort: Pulmonary effort is normal. No respiratory distress. Breath sounds: Normal breath sounds. Abdominal:      General: Bowel sounds are normal.      Palpations: Abdomen is soft. Tenderness: There is no abdominal tenderness. Musculoskeletal:         General: Normal range of motion. Cervical back: Normal range of motion and neck supple. Skin:     General: Skin is warm. Capillary Refill: Capillary refill takes less than 2 seconds. Findings: No rash. Neurological:      Mental Status: He is alert and oriented to person, place, and time.       Comments: No gross motor or sensory deficits        MDM  Number of Diagnoses or Management Options  Epistaxis  Nonintractable headache, unspecified chronicity pattern, unspecified headache type  Diagnosis management comments: Patient with current events of nosebleed status post cauterization in ER resolving bleed. Patient also complained of mild headache which was not his main complaint however headache is resolved as receiving Tylenol. Patient had no focal neurologic deficits no trauma injuries no need for imaging of the head at this time. Given follow-up information for ENT.            Epistaxis Management    Date/Time: 10/5/2022 1:35 AM  Performed by: Iveth Arana MD  Authorized by: Iveth Arana MD     Consent:     Consent obtained:  Verbal    Consent given by:  Patient    Risks, benefits, and alternatives were discussed: yes      Risks discussed:  Bleeding, infection, nasal injury and pain    Alternatives discussed:  No treatment and referral  Universal protocol:     Patient identity confirmed:  Verbally with patient and arm band  Anesthesia:     Anesthesia method:  Topical application    Topical anesthetic:  Lidocaine gel  Procedure details:     Treatment site:  R septum    Treatment method:  Silver nitrate    Treatment complexity:  Limited    Treatment episode: initial    Post-procedure details:     Assessment:  Bleeding stopped    Procedure completion:  Tolerated

## 2023-01-04 DIAGNOSIS — N40.1 BENIGN PROSTATIC HYPERPLASIA WITH INCOMPLETE BLADDER EMPTYING: ICD-10-CM

## 2023-01-04 DIAGNOSIS — R39.14 BENIGN PROSTATIC HYPERPLASIA WITH INCOMPLETE BLADDER EMPTYING: ICD-10-CM

## 2023-01-12 NOTE — PROGRESS NOTES
HISTORY OF PRESENT ILLNESS  [place] Friday is a 69 y.o. male. Chief Complaint   Patient presents with    Follow-up    Benign Prostatic Hypertrophy    Incomplete Bladder Emptying    Nocturia    Annual Exam     Past Medical History:  PMHx (including negatives):  has a past medical history of Hypercholesterolemia, Hypertension, and Sleep apnea. PSurgHx:  has a past surgical history that includes colonoscopy (N/A, 7/28/2021). PSocHx:  reports that he has never smoked. He has never used smokeless tobacco. He reports that he does not currently use alcohol. He reports that he does not use drugs. BPH; mildly bothersome sensation of incomplete emptying though he has not had any significant residual on scan. Can double void at times. Nocturia improved with fluid restriction. He voids 4-5x per night. It is a little bit and he has a sense of not emptying. He does drink up until bedtime. PSA 1/11/22 was 3.4    He occasionally has testicle aches, brief and limited. Chronic Conditions Addressed Today       1. Benign prostatic hyperplasia with incomplete bladder emptying     Overview      Some sensation of double voiding, mildly bothersome. Current Assessment & Plan      Worsening nocturia and incomplete emptying. I recommend starting tamsulosin. The patient was instructed on the dosing of the medication and reason for taking it. We discussed the common and serious risks. The patient is advised to be cautious of side effects with a new medication. Relevant Medications     tamsulosin (FLOMAX) 0.4 mg capsule     Other Relevant Orders     AMB POC URINALYSIS DIP STICK AUTO W/O MICRO    2. Feeling of incomplete bladder emptying - Primary     Overview      He feels that he cannot empty and often double voids. PVR has not been significant. High fluid intake may make him void more.           Relevant Medications     tamsulosin (FLOMAX) 0.4 mg capsule     Other Relevant Orders     AMB POC URINALYSIS DIP STICK AUTO W/O MICRO    3. Nocturia     Overview      3 times/night. Improved with fluid restriction. 4. Prostate cancer screening     Overview      No family hx of prostate cancer. 1/11/22: 3.4          Current Assessment & Plan       Benign exam today. PSA now                 ROS  Patient denies the symptoms of COVID-19 per routine screening guidelines. Physical Exam  Constitutional:       General: He is not in acute distress. Pulmonary:      Effort: No respiratory distress. Abdominal:      Palpations: There is no mass. Hernia: No hernia is present. Genitourinary:     Penis: Normal. No phimosis, hypospadias, tenderness or swelling. Testes: Normal.         Right: Mass, tenderness or swelling not present. Right testis is descended. Left: Mass, tenderness or swelling not present. Left testis is descended. Epididymis:      Right: Normal. Not inflamed or enlarged. No mass or tenderness. Left: Normal. Not inflamed or enlarged. No mass or tenderness. Prostate: Not enlarged (0-1+, benign), not tender and no nodules present. Comments: Testes slightly atrophic      ASSESSMENT and PLAN  Diagnoses and all orders for this visit:    1. Feeling of incomplete bladder emptying  -     AMB POC URINALYSIS DIP STICK AUTO W/O MICRO    2. Benign prostatic hyperplasia with incomplete bladder emptying  Assessment & Plan:  Worsening nocturia and incomplete emptying. I recommend starting tamsulosin. The patient was instructed on the dosing of the medication and reason for taking it. We discussed the common and serious risks. The patient is advised to be cautious of side effects with a new medication. Orders:  -     AMB POC URINALYSIS DIP STICK AUTO W/O MICRO    3. Nocturia    4. Prostate cancer screening  Assessment & Plan:   Benign exam today. PSA now      Other orders  -     tamsulosin (FLOMAX) 0.4 mg capsule; Take 1 Capsule by mouth daily.          Follow-up and Dispositions    Return in about 3 months (around 4/13/2023) for NP visit ok. Jori Crewsma Friday may have a reminder for a \"due or due soon\" health maintenance. The patient has been encouraged to contact their primary care provider for follow-up on this health maintenance or other necessary and/or routine health screening. Aminta Chanel MD     Please note that portions of this note were completed with Dragon dictation, the computer voice recognition software. Quite often unanticipated grammatical, syntax, homophones, and other interpretive errors are inadvertently transcribed by the computer software. Please disregard these errors and any other errors that may have escaped final proofreading. Thank you.

## 2023-01-13 ENCOUNTER — OFFICE VISIT (OUTPATIENT)
Dept: UROLOGY | Age: 68
End: 2023-01-13
Payer: MEDICARE

## 2023-01-13 VITALS
TEMPERATURE: 97.8 F | OXYGEN SATURATION: 100 % | HEIGHT: 69 IN | SYSTOLIC BLOOD PRESSURE: 139 MMHG | DIASTOLIC BLOOD PRESSURE: 73 MMHG | BODY MASS INDEX: 28.88 KG/M2 | WEIGHT: 195 LBS | HEART RATE: 79 BPM

## 2023-01-13 DIAGNOSIS — Z12.5 PROSTATE CANCER SCREENING: ICD-10-CM

## 2023-01-13 DIAGNOSIS — R39.14 FEELING OF INCOMPLETE BLADDER EMPTYING: Primary | ICD-10-CM

## 2023-01-13 DIAGNOSIS — R39.14 BENIGN PROSTATIC HYPERPLASIA WITH INCOMPLETE BLADDER EMPTYING: ICD-10-CM

## 2023-01-13 DIAGNOSIS — R35.1 NOCTURIA: ICD-10-CM

## 2023-01-13 DIAGNOSIS — N40.1 BENIGN PROSTATIC HYPERPLASIA WITH INCOMPLETE BLADDER EMPTYING: ICD-10-CM

## 2023-01-13 LAB
BILIRUB UR QL: NEGATIVE
GLUCOSE UR-MCNC: NEGATIVE MG/DL
KETONES P FAST UR STRIP-MCNC: NEGATIVE MG/DL
PH UR STRIP: 5.5 [PH] (ref 4.6–8)
PROT UR QL STRIP: 30
SP GR UR STRIP: 1.02 (ref 1–1.03)
UA UROBILINOGEN AMB POC: NORMAL (ref 0.2–1)
URINALYSIS CLARITY POC: CLEAR
URINALYSIS COLOR POC: YELLOW
URINE BLOOD POC: NEGATIVE
URINE LEUKOCYTES POC: NEGATIVE
URINE NITRITES POC: NEGATIVE

## 2023-01-13 RX ORDER — TAMSULOSIN HYDROCHLORIDE 0.4 MG/1
0.4 CAPSULE ORAL DAILY
Qty: 90 CAPSULE | Refills: 3 | Status: SHIPPED | OUTPATIENT
Start: 2023-01-13

## 2023-01-13 NOTE — ASSESSMENT & PLAN NOTE
Worsening nocturia and incomplete emptying. I recommend starting tamsulosin. The patient was instructed on the dosing of the medication and reason for taking it. We discussed the common and serious risks. The patient is advised to be cautious of side effects with a new medication.

## 2023-01-13 NOTE — LETTER
1/13/2023    Patient: vE Larson Friday   YOB: 1955   Date of Visit: 1/13/2023     OTHER, [address] [facility] [address]  Via Fax: [phone]    Dear OTHER, [address],      Thank you for referring Mr. Michelle Young Friday to [facility] for evaluation. My notes for this consultation are attached. If you have questions, please do not hesitate to call me. I look forward to following your patient along with you.       Sincerely,    Shira Stanley MD

## 2023-01-13 NOTE — PROGRESS NOTES
Chief Complaint   Patient presents with    Follow-up    Benign Prostatic Hypertrophy    Incomplete Bladder Emptying    Nocturia    Annual Exam       PHQ-9 score is    Negative    Vitals:    01/13/23 1058   BP: 139/73   Pulse: 79   Temp: 97.8 °F (36.6 °C)   TempSrc: Temporal   SpO2: 100%   Weight: 195 lb (88.5 kg)   Height: 5' 9\" (1.753 m)   PainSc:   0 - No pain        1. \"Have you been to the ER, urgent care clinic since your last visit? Hospitalized since your last visit? \" No    2. \"Have you seen or consulted any other health care providers outside of the [address] [place] [address] since your last visit? \" No     3. For patients aged 39-70: Has the patient had a colonoscopy / FIT/ Cologuard? No      If the patient is female:    4. For patients aged 41-77: Has the patient had a mammogram within the past 2 years? No      5. For patients aged 21-65: Has the patient had a pap smear?  No

## 2023-01-14 LAB — PSA SERPL-MCNC: 3.3 NG/ML (ref 0–4)

## 2023-02-11 PROBLEM — Z12.5 PROSTATE CANCER SCREENING: Status: RESOLVED | Noted: 2022-01-12 | Resolved: 2023-02-11

## 2023-02-21 NOTE — ED TRIAGE NOTES
Physical Therapy Progress Note    Visit Type: Daily Treatment Note  Visit: 6  Referring Provider: Julius Wick MD  Medical Diagnosis (from order): Diagnosis Information    Diagnosis  723.1 (ICD-9-CM) - M54.2 (ICD-10-CM) - Neck pain       Chart reviewed at time of initial evaluation (relevant co-morbidities, allergies, tests and medications listed):   hypertension and diabetes  Past Medical History:  No date: Anxiety      Comment:  Panic attacks  No date: Cervical spondylosis  No date: Depression  No date: Diabetes mellitus (CMS/[facility])      Comment:  IDDM  No date: Essential (primary) hypertension  No date: High cholesterol  No date: Malignant neoplasm (CMS/[facility])      Comment:  HX: Breast CA - L. breast lumpectomy - avoid Left arm                for BP/IV  2022: Stroke (CMS/[facility])      Comment:  Left sided weakness/hemiparesis - uses w/c & cane (Rt.                basal ganglia hemorrhage)  Past Surgical History:  : Back surgery      Comment:  Lumbar L4/5 Microdiskectomy  2022: Brain surgery      Comment:  EVD drain placed & mary lou removed (Ventriculostomy)  2007: Breast surgery; Left      Comment:  Left breast lumpectomy - avoid left arm for bp/iv  No date: Cervical fusion      Comment:   C5-6 ACDF/  C4-7 ACDF; 2022 cervical spine C6C7   No date:  section, classic  No date: Foot/toes surgery proc unlisted; Left      Comment:  Nail in toe removal sx  : Other surgical history      Comment:  [place] tube reconstruction  ALLERGIES:   -- Hydrocodone-Acetaminophen -- RASH  Current Outpatient Medications:  traMADol (ULTRAM) 50 MG tablet, Take 1 tab every 4-6 hours prn.  montelukast (SINGULAIR) 10 MG tablet, Take 10 mg by mouth nightly.  gabapentin (NEURONTIN) 300 MG capsule, Take 300 mg by mouth nightly.  atorvastatin (LIPITOR) 80 MG tablet, Take 80 mg by mouth every evening. Indications: High Amount of Fats in the Blood  calcium carbonate (TUMS) 500 MG chewable tablet, Chew 1  Pt reports \"I have been having pain in my testicle and rectum since my colonoscopy\". Also reports abdominal pain. tablet by mouth every 4 hours as needed for Heartburn.  acetaminophen (TYLENOL) 325 MG tablet, Take 650 mg by mouth 2 times daily. Indications: Pain  amLODIPine (NORVASC) 10 MG tablet, Take 10 mg by mouth daily. Indications: High Blood Pressure Disorder  Glycerin-Hypromellose- (Artificial Tears) 0.2-0.2-1 % Solution, Place 1 drop into both eyes 2 times daily. Indications: dry eyes  Menthol, Topical Analgesic, (Biofreeze) 4 % Gel, Apply topically 2 times daily as needed. Indications: Pain  Glycerin-Hypromellose- (Dry Eye Relief Drops) 0.2-0.2-1 % Solution, Place 1 drop into both eyes every 8 hours as needed. Indications: dry eyes  famotidine (PEPCID) 40 MG tablet, Take 40 mg by mouth every evening.  hydrALAZINE (APRESOLINE) 10 MG tablet, Take 10 mg by mouth every 12 hours as needed. Indications: for BP greater than 150  insulin lispro 100 UNIT/ML injectable solution, Inject into the skin 4 times daily (before meals and nightly). For blood sugar:  150-199: give 2 units insulin  200-249: give 4 units insulin  250-299: give 6 units insulin  300-349: give 8 units insulin  350-400: give 10 units insulin  Greater than 401, call MD  insulin glargine (LANTUS) 100 UNIT/ML vial solution, Inject 25 Units into the skin nightly.  loperamide (IMODIUM) 2 MG capsule, Take 2 mg by mouth every 4 hours as needed for Diarrhea.  losartan (COZAAR) 100 MG tablet, Take 100 mg by mouth daily. Indications: High Blood Pressure Disorder Hold for SBP less than 100  metFORMIN (GLUCOPHAGE) 850 MG tablet, Take 850 mg by mouth 2 times daily (with meals). Indications: for DM  metoPROLOL tartrate (LOPRESSOR) 50 MG tablet, Take 50 mg by mouth 2 times daily. Indications: High Blood Pressure Disorder  aluminum-magnesium hydroxide-simethicone (MAALOX) 200-200-20 MG/5ML Suspension, Take 30 mLs by mouth as needed. Indications: Heartburn  ondansetron (ZOFRAN) 4 MG tablet, Take 4 mg by mouth every 6 hours as needed for Nausea.  acetaminophen  (TYLENOL) 325 MG tablet, Take 650 mg by mouth every 4 hours as needed for Pain.    No current facility-administered medications for this encounter.        SUBJECTIVE                                                                                                               2/21/2023: left neck is still sore.  No longer has tingling in right arm.    2/14/2023:  Currently, no pain.    2/7/2023: no longer has neck pain.  Just pain at posterior scar.  No longer has the numbness in right hand anymore.  The middle finger on right is sore.    1/31/2023: When performing home exercise program her left scapula hurt.  Currently the left scapula is less sore.  The numbness in right hand is still the same.  She is not sure she is performing diaphragmatic breathing correctly.    1/24/2023: Most discomfort is the numbness in hand.  Pain is less.  Performing home exercise program.    1/17/2023: INITIAL EVALUATION: Patient is a 62 yo right hand dominant female comes to physical therapy for neck pain s/p C6C7 anterior / post fusion DOS: 12/08/2022.  Patient had rehab for [facility] summer 2020.  After stroke she started having pain on left neck that referred down left upper extremity resulting in neck surgery again.  Current symptoms:  Pain bilateral shoulders referring down bilateral elbows and pain / tingling down left to all fingers and last 3 fingers down right.  Since the cervical spine surgery she hasn't been able to walk as much.  At home she walks with cane in the house.  Sometimes sitting in chair / recliner.  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    Pain / Symptoms  - Pain rating (out of 10): Current: 6 ; Best: 0; Worst: 10      OBJECTIVE                                                                                                                                       Treatment     Gait belt applied and used throughout session.  Therapeutic Exercise  Reviewed home exercise program  Compression / decompression  left g-h joint with wrist on table and elbow straight  Compression / decompression left g-h joint through elbow       Not performed:   Seated thoracic spine Sidebending -   Seated Thoracic Flexion and Rotation with Arms Crossed -   Instructed patient to have left elbow supported on firm surface and lean through shoulder and elbow to facilitate stability left g-h joint.  Seated Shoulder External Rotation with yellow theraband   Standing ball rolls on elevated table to facilitate trunk rotation  Education on resting left forearm on firm surface for support instead of letting arm hang down to her side.  Seated Shoulder Shrug Circles AROM Forward   Seated weight shift with assist to reach left arm laterally.  Standing push ups with hands on orange ball on elevated table.  Hips resting on edge of table  Standing thoracic spine flexion and initiate thoracic spine extension, hands on elevated table.  Seated Diaphragmatic Breathing -  Seated Scapular Retraction -  Education on evaluation findings, treatment and POC  Education on anatomy and mechanics of cervical spine   Education on pain precautions  Correct Seated Posture -   Seated Cervical Rotation AROM - emphasis to perform in correct sitting posture  Recommended patient to increase walking endurance           Manual Therapy   soft tissue mobilization bilateral upper trap and cervical spine paraspinals.  Right sidelying - Left scapular glides.   Passive range of motion left shoulder   Right sidelying thoracic spine rotation forward and backward with manual stretch  Manual gentle cervical traction       Not performed:   Passive nerve glides right upper extremity     Therapeutic Activity  Not performed:   Education on sitting posture with rolled towel and recommended sitting in supportive chair with firm back and avoid sitting in recliner all day.  Education to patient and  on scar tissue mobility, issued resource to get scar-away slicone  strips        Neuromuscular Re-Education  Seated rhythmic stab resisting all directions of trunk.    Skilled input: verbal instruction/cues, tactile instruction/cues and posture correction    Writer verbally educated and received verbal consent for hand placement, positioning of patient, and techniques to be performed today from patient for clothing adjustments for techniques, therapist position for techniques and hand placement and palpation for techniques as described above and how they are pertinent to the patient's plan of care.    Home Exercise Program  2/14/2023  ? Seated Sidebending - 2 x daily - 7 x weekly - 1-2 sets - 10 reps  ? Seated Thoracic Flexion and Rotation with Arms Crossed - 2 x daily - 7 x weekly - 1-2 sets - 10 reps    2/7/2023  ? Seated Shoulder External Rotation with Resistance - 1 x daily - 7 x weekly - 3 sets - 10 reps    1/31/2023  ? Seated Shoulder Shrug Circles AROM Forward - 2-3 x daily - 7 x weekly - 1-2 sets - 10 reps    1/24/2023  ? Seated Scapular Retraction - 2-3 x daily - 7 x weekly - 1-2 sets - 10 reps - 5 hold  ? Seated Diaphragmatic Breathing - 2-3 x daily - 7 x weekly - 1-2 sets - 10 reps    Access Code: [email or web address]  URL: [email or web address]  Date: 01/17/2023  Prepared by: Daniela Prust    Exercises  ? Seated Correct Posture - 7 x weekly  ? Correct Seated Posture - 3 x daily - 7 x weekly - 1-2 sets - 10 reps  ? Seated Cervical Rotation AROM - 2-3 x daily - 7 x weekly - 1-2 sets - 10 reps           ASSESSMENT                                                                                                            2/21/2023: Patient had no pain after treatment session.  Soft tissue restrictions left upper trap.    2/14/2023;  Patient progressing, decreased neck pain, decreased tingling in right arm. Patient experiences tingling in left arm to hand when left arm is hanging, not supported.  Patient wears sling to support arm and that decreases the tingling  down left arm.  Mild subluxation of left humeral head when not supported that may contribute to tingling in left hand.  Soft tissue restrictions left upper trap.    2/7/2023: Soft tissue restrictions improved and less tenderness.  Difficulty performing external rotation neutral with left upper extremity - however she is able to perform without resistance.    1/31/2023: Patient felt good stretching with large ball.  Good scapular glides and weight shift.    1/24/2023: Improved active range of motion cervical spine rotation.  Soft tissue restrictions bilateral upper trap, left > right.  Difficulty maintaining correct seated posture, however she can demonstrate it correctly.  verbal cueing to keep left shoulder from elevating with scapular retraction.    1/17/2023: INITIAL EVALUATION: Patient is a 62 yo right hand dominant female comes to physical therapy for neck pain s/p C6C7 anterior / post fusion. The patient presents to physical therapy with the following deficits:  1.  NDI = 62%  2. Postural deficits  3.  Functional limitation in active range of motion cervical spine .   As a result of these deficits the patient's function level has also been impaired as manifest by their functional tool score (NDI = 62% ). Patient's weakness in left upper extremity may be related to s/p CVA 2/22 resulting in left sided weakness and gait deficits.  assists patient with ADL's and household tasks.The patient will benefit from skilled PT intervention that will focus on patient education, therapeutic exercise, and manual therapy techniques.  Failure to intervene could lead to permanent function loss and could lead to increased chronicity of this disorder.  PROBLEM LIST:  1. NDI = 62%.    2.  Postural Deficits.    3.  Functional limitation in active range of motion cervical spine .   Education:   - Present and ready to learn: patient  - Results of above outlined education: Verbalizes understanding and Demonstrates  understanding    PLAN                                                                                                                           Suggestions for next session as indicated: Progress per plan of care  Left scapula, shoulder, subscap mobility       Therapy procedure time and total treatment time can be found documented on the Time Entry flowsheet

## 2024-12-27 ENCOUNTER — HOSPITAL ENCOUNTER (EMERGENCY)
Facility: HOSPITAL | Age: 69
Discharge: HOME | End: 2024-12-27
Payer: MEDICARE

## 2024-12-27 ENCOUNTER — APPOINTMENT (OUTPATIENT)
Facility: HOSPITAL | Age: 69
End: 2024-12-27
Payer: MEDICARE

## 2024-12-27 VITALS
DIASTOLIC BLOOD PRESSURE: 80 MMHG | WEIGHT: 195 LBS | RESPIRATION RATE: 18 BRPM | HEIGHT: 69 IN | BODY MASS INDEX: 28.88 KG/M2 | SYSTOLIC BLOOD PRESSURE: 158 MMHG | TEMPERATURE: 98.3 F | OXYGEN SATURATION: 99 % | HEART RATE: 68 BPM

## 2024-12-27 DIAGNOSIS — M54.2 NECK PAIN: Primary | ICD-10-CM

## 2024-12-27 PROCEDURE — 70450 CT HEAD/BRAIN W/O DYE: CPT

## 2024-12-27 PROCEDURE — 99284 EMERGENCY DEPT VISIT MOD MDM: CPT

## 2024-12-27 PROCEDURE — 6370000000 HC RX 637 (ALT 250 FOR IP)

## 2024-12-27 PROCEDURE — 72040 X-RAY EXAM NECK SPINE 2-3 VW: CPT

## 2024-12-27 RX ORDER — IBUPROFEN 800 MG/1
800 TABLET, FILM COATED ORAL
Status: COMPLETED | OUTPATIENT
Start: 2024-12-27 | End: 2024-12-27

## 2024-12-27 RX ORDER — METHOCARBAMOL 750 MG/1
750 TABLET, FILM COATED ORAL 4 TIMES DAILY
Qty: 40 TABLET | Refills: 0 | Status: SHIPPED | OUTPATIENT
Start: 2024-12-27 | End: 2025-01-06

## 2024-12-27 RX ORDER — IBUPROFEN 600 MG/1
600 TABLET, FILM COATED ORAL 3 TIMES DAILY PRN
Qty: 30 TABLET | Refills: 0 | Status: SHIPPED | OUTPATIENT
Start: 2024-12-27

## 2024-12-27 RX ORDER — LIDOCAINE 4 G/G
1 PATCH TOPICAL
Status: DISCONTINUED | OUTPATIENT
Start: 2024-12-27 | End: 2024-12-27 | Stop reason: HOSPADM

## 2024-12-27 RX ORDER — METHOCARBAMOL 500 MG/1
1000 TABLET, FILM COATED ORAL
Status: COMPLETED | OUTPATIENT
Start: 2024-12-27 | End: 2024-12-27

## 2024-12-27 RX ORDER — LIDOCAINE 50 MG/G
1 PATCH TOPICAL DAILY
Qty: 10 PATCH | Refills: 0 | Status: SHIPPED | OUTPATIENT
Start: 2024-12-27 | End: 2025-01-06

## 2024-12-27 RX ADMIN — METHOCARBAMOL TABLETS 1000 MG: 500 TABLET, COATED ORAL at 12:30

## 2024-12-27 RX ADMIN — IBUPROFEN 800 MG: 800 TABLET, FILM COATED ORAL at 12:30

## 2024-12-27 ASSESSMENT — PAIN SCALES - GENERAL
PAINLEVEL_OUTOF10: 3
PAINLEVEL_OUTOF10: 6

## 2024-12-27 ASSESSMENT — PAIN - FUNCTIONAL ASSESSMENT
PAIN_FUNCTIONAL_ASSESSMENT: 0-10
PAIN_FUNCTIONAL_ASSESSMENT: 0-10

## 2024-12-27 ASSESSMENT — PAIN DESCRIPTION - ORIENTATION: ORIENTATION: RIGHT

## 2024-12-27 ASSESSMENT — PAIN DESCRIPTION - LOCATION: LOCATION: NECK

## 2024-12-27 ASSESSMENT — LIFESTYLE VARIABLES
HOW MANY STANDARD DRINKS CONTAINING ALCOHOL DO YOU HAVE ON A TYPICAL DAY: PATIENT DOES NOT DRINK
HOW OFTEN DO YOU HAVE A DRINK CONTAINING ALCOHOL: NEVER

## 2024-12-27 NOTE — ED PROVIDER NOTES
[facility] EMERGENCY DEPT  EMERGENCY DEPARTMENT HISTORY AND PHYSICAL EXAM      Date: 12/27/2024  Patient Name: Gt Finney  MRN: [id number]  YOB: 1955  Date of evaluation: 12/27/2024  Provider: Caryl Cruz PA-C   Note Started: 10:43 AM EST 12/27/24    HISTORY OF PRESENT ILLNESS     Chief Complaint   Patient presents with    Neck Pain     R       History Provided By: Patient    HPI: Gt Finney is a 69 y.o. male with PMH as described below who presents ambulatory to the ED for right-sided neck pain that radiates into his right shoulder.  Movement exacerbates his pain.  He reports no pain at rest.  He states that he has had some numbness and tingling in that area but the numbness and tingling does not radiate down his arm.  No unilateral weakness, slurred speech, confusion, vision changes, headaches, lightheadedness, or syncope.  No injury or trauma to his neck.  He has not taken any medications for his symptoms.  No fevers, chills, cough, congestion, GI symptoms,  symptoms, SOB, or CP.    PAST MEDICAL HISTORY   Past Medical History:  Past Medical History:   Diagnosis Date    Hypercholesterolemia     Hypertension     Sleep apnea     uses cpap at home.        Past Surgical History:  Past Surgical History:   Procedure Laterality Date    COLONOSCOPY N/A 7/28/2021    COLONOSCOPY performed by Edgardo Ghotra MD at [facility] ENDOSCOPY       Family History:  No family history on file.    Social History:  Social History     Tobacco Use    Smoking status: Never    Smokeless tobacco: Never   Substance Use Topics    Alcohol use: Not Currently    Drug use: Never       Allergies:  Allergies   Allergen Reactions    Penicillins Hives    Atorvastatin Diarrhea    Iodinated Contrast Media Rash    Tomato Rash       PCP: None, None    Current Meds:   No current facility-administered medications for this encounter.     Current Outpatient Medications   Medication Sig Dispense Refill    ibuprofen (ADVIL;MOTRIN) 600 MG

## 2024-12-27 NOTE — DISCHARGE INSTRUCTIONS
Thank you for choosing our Emergency Department for your care.  It is our privilege to care for you in your time of need.  In the next several days, you may receive a survey via email or mailed to your home about your experience with our team.  We would greatly appreciate you taking a few minutes to complete the survey, as we use this information to learn what we have done well and what we could be doing better. Thank you for trusting us with your care!    Below you will find a list of your tests from today's visit.   Labs and Radiology Studies  No results found for this or any previous visit (from the past 12 hour(s)).  CT HEAD WO CONTRAST    Result Date: 12/27/2024  EXAM: CT HEAD WO CONTRAST INDICATION: Neck pain, numbness/tingling x 3 days COMPARISON: None. CONTRAST: None. TECHNIQUE: Unenhanced CT of the head was performed using 5 mm images. Brain and bone windows were generated. Coronal and sagittal reformats. CT dose reduction was achieved through use of a standardized protocol tailored for this examination and automatic exposure control for dose modulation.  FINDINGS: The ventricles and sulci are normal in size, shape and configuration. There is no significant white matter disease. There is no intracranial hemorrhage, extra-axial collection, or mass effect. The basilar cisterns are open. No CT evidence of acute infarct. The bone windows demonstrate no abnormalities. The visualized portions of the paranasal sinuses and mastoid air cells are clear.     No acute intracranial process identified Electronically signed by Adriana Gottlieb    XR CERVICAL SPINE (2-3 VIEWS)    Result Date: 12/27/2024  EXAM: XR CERVICAL SPINE (2-3 VIEWS) INDICATION: Neck pain COMPARISON: None. FINDINGS: AP, lateral, swimmers, and open mouth odontoid views of the cervical spine were obtained. The alignment is normal. The vertebral body heights are well-preserved. There is no fracture or subluxation. The prevertebral soft tissues are

## 2025-01-27 ENCOUNTER — HOSPITAL ENCOUNTER (EMERGENCY)
Facility: HOSPITAL | Age: 70
Discharge: HOME | End: 2025-01-27
Attending: EMERGENCY MEDICINE
Payer: MEDICARE

## 2025-01-27 VITALS
DIASTOLIC BLOOD PRESSURE: 92 MMHG | TEMPERATURE: 97.9 F | SYSTOLIC BLOOD PRESSURE: 167 MMHG | HEIGHT: 69 IN | WEIGHT: 198 LBS | OXYGEN SATURATION: 100 % | RESPIRATION RATE: 16 BRPM | HEART RATE: 61 BPM | BODY MASS INDEX: 29.33 KG/M2

## 2025-01-27 DIAGNOSIS — R11.11 VOMITING WITHOUT NAUSEA, UNSPECIFIED VOMITING TYPE: Primary | ICD-10-CM

## 2025-01-27 DIAGNOSIS — R19.7 DIARRHEA, UNSPECIFIED TYPE: ICD-10-CM

## 2025-01-27 LAB
ALBUMIN SERPL-MCNC: 4.1 G/DL (ref 3.5–5)
ALBUMIN/GLOB SERPL: 1.1 (ref 1.1–2.2)
ALP SERPL-CCNC: 54 U/L (ref 45–117)
ALT SERPL-CCNC: 19 U/L (ref 12–78)
ANION GAP SERPL CALC-SCNC: 6 MMOL/L (ref 2–12)
APPEARANCE UR: CLEAR
AST SERPL W P-5'-P-CCNC: 23 U/L (ref 15–37)
BACTERIA URNS QL MICRO: NEGATIVE /HPF
BASOPHILS # BLD: 0.02 K/UL (ref 0–0.1)
BASOPHILS NFR BLD: 0.3 % (ref 0–1)
BILIRUB SERPL-MCNC: 0.5 MG/DL (ref 0.2–1)
BILIRUB UR QL: NEGATIVE
BUN SERPL-MCNC: 13 MG/DL (ref 6–20)
BUN/CREAT SERPL: 11 (ref 12–20)
CA-I BLD-MCNC: 9.3 MG/DL (ref 8.5–10.1)
CHLORIDE SERPL-SCNC: 109 MMOL/L (ref 97–108)
CO2 SERPL-SCNC: 27 MMOL/L (ref 21–32)
COLOR UR: ABNORMAL
CREAT SERPL-MCNC: 1.21 MG/DL (ref 0.7–1.3)
DIFFERENTIAL METHOD BLD: ABNORMAL
EOSINOPHIL # BLD: 0.01 K/UL (ref 0–0.4)
EOSINOPHIL NFR BLD: 0.1 % (ref 0–7)
EPITH CASTS URNS QL MICRO: ABNORMAL /LPF
ERYTHROCYTE [DISTWIDTH] IN BLOOD BY AUTOMATED COUNT: 13.3 % (ref 11.5–14.5)
GLOBULIN SER CALC-MCNC: 3.7 G/DL (ref 2–4)
GLUCOSE SERPL-MCNC: 110 MG/DL (ref 65–100)
GLUCOSE UR STRIP.AUTO-MCNC: NEGATIVE MG/DL
HCT VFR BLD AUTO: 43.8 % (ref 36.6–50.3)
HGB BLD-MCNC: 13.8 G/DL (ref 12.1–17)
HGB UR QL STRIP: NEGATIVE
IMM GRANULOCYTES # BLD AUTO: 0.02 K/UL (ref 0–0.04)
IMM GRANULOCYTES NFR BLD AUTO: 0.3 % (ref 0–0.5)
KETONES UR QL STRIP.AUTO: 5 MG/DL
LEUKOCYTE ESTERASE UR QL STRIP.AUTO: NEGATIVE
LIPASE SERPL-CCNC: 37 U/L (ref 13–75)
LYMPHOCYTES # BLD: 0.53 K/UL (ref 0.8–3.5)
LYMPHOCYTES NFR BLD: 6.9 % (ref 12–49)
MAGNESIUM SERPL-MCNC: 1.9 MG/DL (ref 1.6–2.4)
MCH RBC QN AUTO: 26.6 PG (ref 26–34)
MCHC RBC AUTO-ENTMCNC: 31.5 G/DL (ref 30–36.5)
MCV RBC AUTO: 84.4 FL (ref 80–99)
MONOCYTES # BLD: 0.33 K/UL (ref 0–1)
MONOCYTES NFR BLD: 4.3 % (ref 5–13)
MUCOUS THREADS URNS QL MICRO: ABNORMAL /LPF
NEUTS SEG # BLD: 6.76 K/UL (ref 1.8–8)
NEUTS SEG NFR BLD: 88.1 % (ref 32–75)
NITRITE UR QL STRIP.AUTO: NEGATIVE
NRBC # BLD: 0 K/UL (ref 0–0.01)
NRBC BLD-RTO: 0 PER 100 WBC
PH UR STRIP: 7 (ref 5–8)
PLATELET # BLD AUTO: 147 K/UL (ref 150–400)
PMV BLD AUTO: 11.6 FL (ref 8.9–12.9)
POTASSIUM SERPL-SCNC: 4.2 MMOL/L (ref 3.5–5.1)
PROT SERPL-MCNC: 7.8 G/DL (ref 6.4–8.2)
PROT UR STRIP-MCNC: 30 MG/DL
RBC # BLD AUTO: 5.19 M/UL (ref 4.1–5.7)
RBC #/AREA URNS HPF: ABNORMAL /HPF (ref 0–5)
SODIUM SERPL-SCNC: 142 MMOL/L (ref 136–145)
SP GR UR REFRACTOMETRY: 1.02 (ref 1–1.03)
UROBILINOGEN UR QL STRIP.AUTO: 2 EU/DL (ref 0.1–1)
WBC # BLD AUTO: 7.7 K/UL (ref 4.1–11.1)
WBC URNS QL MICRO: ABNORMAL /HPF (ref 0–4)

## 2025-01-27 PROCEDURE — 99284 EMERGENCY DEPT VISIT MOD MDM: CPT

## 2025-01-27 PROCEDURE — 80053 COMPREHEN METABOLIC PANEL: CPT

## 2025-01-27 PROCEDURE — 83735 ASSAY OF MAGNESIUM: CPT

## 2025-01-27 PROCEDURE — 83690 ASSAY OF LIPASE: CPT

## 2025-01-27 PROCEDURE — 96374 THER/PROPH/DIAG INJ IV PUSH: CPT

## 2025-01-27 PROCEDURE — 6370000000 HC RX 637 (ALT 250 FOR IP): Performed by: EMERGENCY MEDICINE

## 2025-01-27 PROCEDURE — 96361 HYDRATE IV INFUSION ADD-ON: CPT

## 2025-01-27 PROCEDURE — 6360000002 HC RX W HCPCS: Performed by: EMERGENCY MEDICINE

## 2025-01-27 PROCEDURE — 2580000003 HC RX 258: Performed by: EMERGENCY MEDICINE

## 2025-01-27 PROCEDURE — 85025 COMPLETE CBC W/AUTO DIFF WBC: CPT

## 2025-01-27 PROCEDURE — 81001 URINALYSIS AUTO W/SCOPE: CPT

## 2025-01-27 RX ORDER — ONDANSETRON 2 MG/ML
8 INJECTION INTRAMUSCULAR; INTRAVENOUS ONCE
Status: COMPLETED | OUTPATIENT
Start: 2025-01-27 | End: 2025-01-27

## 2025-01-27 RX ORDER — ONDANSETRON 4 MG/1
4 TABLET, ORALLY DISINTEGRATING ORAL 3 TIMES DAILY PRN
Qty: 21 TABLET | Refills: 0 | Status: SHIPPED | OUTPATIENT
Start: 2025-01-27

## 2025-01-27 RX ORDER — 0.9 % SODIUM CHLORIDE 0.9 %
1000 INTRAVENOUS SOLUTION INTRAVENOUS
Status: COMPLETED | OUTPATIENT
Start: 2025-01-27 | End: 2025-01-27

## 2025-01-27 RX ORDER — MAGNESIUM HYDROXIDE/ALUMINUM HYDROXICE/SIMETHICONE 120; 1200; 1200 MG/30ML; MG/30ML; MG/30ML
30 SUSPENSION ORAL ONCE
Status: COMPLETED | OUTPATIENT
Start: 2025-01-27 | End: 2025-01-27

## 2025-01-27 RX ADMIN — ONDANSETRON 8 MG: 2 INJECTION INTRAMUSCULAR; INTRAVENOUS at 17:09

## 2025-01-27 RX ADMIN — ALUMINUM HYDROXIDE, MAGNESIUM HYDROXIDE, AND SIMETHICONE 30 ML: 200; 200; 20 SUSPENSION ORAL at 17:10

## 2025-01-27 RX ADMIN — SODIUM CHLORIDE 1000 ML: 9 INJECTION, SOLUTION INTRAVENOUS at 17:09

## 2025-01-27 ASSESSMENT — LIFESTYLE VARIABLES
HOW OFTEN DO YOU HAVE A DRINK CONTAINING ALCOHOL: NEVER
HOW MANY STANDARD DRINKS CONTAINING ALCOHOL DO YOU HAVE ON A TYPICAL DAY: PATIENT DOES NOT DRINK

## 2025-01-27 ASSESSMENT — PAIN - FUNCTIONAL ASSESSMENT
PAIN_FUNCTIONAL_ASSESSMENT: NONE - DENIES PAIN
PAIN_FUNCTIONAL_ASSESSMENT: NONE - DENIES PAIN

## 2025-01-27 NOTE — ED TRIAGE NOTES
Presents with EMS for N/V x2 hours and 1 episode of diarrhea, hypertensive en route, currently off of medication and taking home remedies instead for hx of same

## 2025-01-27 NOTE — ED PROVIDER NOTES
[facility] EMERGENCY DEPT  EMERGENCY DEPARTMENT HISTORY AND PHYSICAL EXAM      Date: 1/27/2025  Patient Name: Gt BAR Friday  MRN: [id number]  Birthdate 1955  Date of evaluation: 1/27/2025  Provider: Eduar Reid MD   Note Started: 4:47 PM EST 1/27/25    HISTORY OF PRESENT ILLNESS     Chief Complaint   Patient presents with    Nausea    Vomiting       History Provided By: Patient    HPI: Gt BAR Friday is a 70 y.o. male presents with nausea and diarrhea    PAST MEDICAL HISTORY   Past Medical History:  Past Medical History:   Diagnosis Date    Hypercholesterolemia     Hypertension     Sleep apnea     uses cpap at home.        Past Surgical History:  Past Surgical History:   Procedure Laterality Date    COLONOSCOPY N/A 7/28/2021    COLONOSCOPY performed by Edgardo Ghotra MD at [facility] ENDOSCOPY       Family History:  History reviewed. No pertinent family history.    Social History:  Social History     Tobacco Use    Smoking status: Never    Smokeless tobacco: Never   Substance Use Topics    Alcohol use: Not Currently    Drug use: Never       Allergies:  Allergies   Allergen Reactions    Penicillins Hives    Atorvastatin Diarrhea    Iodinated Contrast Media Rash    Tomato Rash       PCP: None, None    Current Meds:   No current facility-administered medications for this encounter.     Current Outpatient Medications   Medication Sig Dispense Refill    ondansetron (ZOFRAN-ODT) 4 MG disintegrating tablet Take 1 tablet by mouth 3 times daily as needed for Nausea or Vomiting 21 tablet 0    ibuprofen (ADVIL;MOTRIN) 600 MG tablet Take 1 tablet by mouth 3 times daily as needed for Pain 30 tablet 0    Cholecalciferol 75 MCG (3000 UT) TABS Take by mouth      simvastatin (ZOCOR) 80 MG tablet Take 1 tablet by mouth nightly      tamsulosin (FLOMAX) 0.4 MG capsule Take 1 capsule by mouth daily         Social Determinants of Health:   Social Determinants of Health     Tobacco Use: Low Risk  (1/27/2025)    Patient History

## 2025-01-28 ENCOUNTER — CARE COORDINATION (OUTPATIENT)
Dept: OTHER | Facility: CLINIC | Age: 70
End: 2025-01-28

## 2025-01-28 PROBLEM — I10 PRIMARY HYPERTENSION: Status: ACTIVE | Noted: 2019-08-13

## 2025-01-28 PROBLEM — G47.30 SLEEP APNEA: Status: ACTIVE | Noted: 2019-08-13

## 2025-01-28 PROBLEM — G89.29 CHRONIC LOW BACK PAIN: Status: ACTIVE | Noted: 2025-01-28

## 2025-01-28 PROBLEM — K21.9 GASTROESOPHAGEAL REFLUX DISEASE: Status: ACTIVE | Noted: 2025-01-28

## 2025-01-28 PROBLEM — G47.00 INSOMNIA: Status: ACTIVE | Noted: 2019-08-13

## 2025-01-28 PROBLEM — E78.5 HYPERLIPIDEMIA: Status: ACTIVE | Noted: 2025-01-28

## 2025-01-28 PROBLEM — F31.75 DEPRESSED BIPOLAR I DISORDER IN PARTIAL REMISSION (HCC): Status: ACTIVE | Noted: 2025-01-28

## 2025-01-28 PROBLEM — J45.909 ASTHMA: Status: ACTIVE | Noted: 2025-01-28

## 2025-01-28 PROBLEM — M54.50 CHRONIC LOW BACK PAIN: Status: ACTIVE | Noted: 2025-01-28

## 2025-01-28 NOTE — CARE COORDINATION
Ambulatory Care Coordination Note     1/28/2025 11:31 AM     Patient outreach attempt by this AC today to offer care management services. ACM was unable to reach the patient by telephone today;   left voice message requesting a return phone call to this ACM.     ACM: Xochilt Ferrara RN     Care Summary Note: HIPAA compliant voicemail left for patient to return ACM call.    PCP/Specialist follow up: Unknown      Follow Up:   Plan for next ACM outreach in approximately 1 week to complete:  - outreach attempt to offer care management services.         Xochilt Ferrara -[phone]

## 2025-01-28 NOTE — CARE COORDINATION
Ambulatory Care Coordination Note     2025 11:44 AM     Patient Current Location:  [place]     This patient was received as a referral from Population health report .    Patient contacted the ACM by telephone. Verified name and  with patient as identifiers. Provided introduction to self, and explanation of the ACM role.   Patient declined care management services at this time.          ACM: Xochilt Ferrara RN     Challenges to be reviewed by the provider   Additional needs identified to be addressed with provider No  none               Method of communication with provider: none.    Utilization: Initial Call - N/A    Care Summary Note: Patient returned ACM call and ACM explained role. Patient declined HRCM stating [facility] Patient states he was in the ED for food poisoning last night and is doing much better. Patient wanted to report that everyone in the ED at [facility] was so kind to him and he rated the service he received for his last 2 visits there a 10/10. Patient stated he would keep ACM information if something should come up that the [facility] is unable to help him with. ACM signed off.     Offered patient enrollment in the Remote Patient Monitoring (RPM) program for in-home monitoring: Patient is not eligible for RPM program because: affiliate provider.    Follow Up:   No further Ambulatory Care Management follow-up scheduled at this time.  Patient  has Ambulatory Care Manager's contact information for any further questions, concerns or needs.       Xochilt Ferrara -[phone]

## 2025-01-28 NOTE — DISCHARGE INSTRUCTIONS
.    Thank you for choosing our Emergency Department for your care.  It is our privilege to care for you in your time of need.  In the next several days, you may receive a survey via email or mailed to your home about your experience with our team.  We would greatly appreciate you taking a few minutes to complete the survey, as we use this information to learn what we have done well and what we could be doing better. Thank you for trusting us with your care!    Below you will find a list of your tests from today's visit.   Labs and Radiology Studies  Recent Results (from the past 12 hour(s))   CBC with Auto Differential    Collection Time: 01/27/25  4:44 PM   Result Value Ref Range    WBC 7.7 4.1 - 11.1 K/uL    RBC 5.19 4.10 - 5.70 M/uL    Hemoglobin 13.8 12.1 - 17.0 g/dL    Hematocrit 43.8 36.6 - 50.3 %    MCV 84.4 80.0 - 99.0 FL    MCH 26.6 26.0 - 34.0 PG    MCHC 31.5 30.0 - 36.5 g/dL    RDW 13.3 11.5 - 14.5 %    Platelets 147 (L) 150 - 400 K/uL    MPV 11.6 8.9 - 12.9 FL    Nucleated RBCs 0.0 0.0  WBC    nRBC 0.00 0.00 - 0.01 K/uL    Neutrophils % 88.1 (H) 32.0 - 75.0 %    Lymphocytes % 6.9 (L) 12.0 - 49.0 %    Monocytes % 4.3 (L) 5.0 - 13.0 %    Eosinophils % 0.1 0.0 - 7.0 %    Basophils % 0.3 0.0 - 1.0 %    Immature Granulocytes % 0.3 0 - 0.5 %    Neutrophils Absolute 6.76 1.80 - 8.00 K/UL    Lymphocytes Absolute 0.53 (L) 0.80 - 3.50 K/UL    Monocytes Absolute 0.33 0.00 - 1.00 K/UL    Eosinophils Absolute 0.01 0.00 - 0.40 K/UL    Basophils Absolute 0.02 0.00 - 0.10 K/UL    Immature Granulocytes Absolute 0.02 0.00 - 0.04 K/UL    Differential Type AUTOMATED     Comprehensive Metabolic Panel    Collection Time: 01/27/25  4:44 PM   Result Value Ref Range    Sodium 142 136 - 145 mmol/L    Potassium 4.2 3.5 - 5.1 mmol/L    Chloride 109 (H) 97 - 108 mmol/L    CO2 27 21 - 32 mmol/L    Anion Gap 6 2 - 12 mmol/L    Glucose 110 (H) 65 - 100 mg/dL    BUN 13 6 - 20 mg/dL    Creatinine 1.21 0.70 - 1.30 mg/dL

## 2025-07-22 ENCOUNTER — HOSPITAL ENCOUNTER (OUTPATIENT)
Facility: HOSPITAL | Age: 70
Setting detail: OUTPATIENT SURGERY
Discharge: HOME | End: 2025-07-22
Attending: SPECIALIST | Admitting: SPECIALIST
Payer: MEDICARE

## 2025-07-22 ENCOUNTER — ANESTHESIA (OUTPATIENT)
Facility: HOSPITAL | Age: 70
End: 2025-07-22
Payer: MEDICARE

## 2025-07-22 ENCOUNTER — ANESTHESIA EVENT (OUTPATIENT)
Facility: HOSPITAL | Age: 70
End: 2025-07-22
Payer: MEDICARE

## 2025-07-22 VITALS
TEMPERATURE: 97.8 F | BODY MASS INDEX: 28.47 KG/M2 | WEIGHT: 192.24 LBS | OXYGEN SATURATION: 97 % | RESPIRATION RATE: 17 BRPM | HEIGHT: 69 IN | DIASTOLIC BLOOD PRESSURE: 55 MMHG | HEART RATE: 63 BPM | SYSTOLIC BLOOD PRESSURE: 104 MMHG

## 2025-07-22 PROCEDURE — 88305 TISSUE EXAM BY PATHOLOGIST: CPT

## 2025-07-22 PROCEDURE — 2709999900 HC NON-CHARGEABLE SUPPLY: Performed by: SPECIALIST

## 2025-07-22 PROCEDURE — 3600007512: Performed by: SPECIALIST

## 2025-07-22 PROCEDURE — 6360000002 HC RX W HCPCS: Performed by: NURSE ANESTHETIST, CERTIFIED REGISTERED

## 2025-07-22 PROCEDURE — 3700000001 HC ADD 15 MINUTES (ANESTHESIA): Performed by: SPECIALIST

## 2025-07-22 PROCEDURE — 7100000010 HC PHASE II RECOVERY - FIRST 15 MIN: Performed by: SPECIALIST

## 2025-07-22 PROCEDURE — 2580000003 HC RX 258: Performed by: NURSE ANESTHETIST, CERTIFIED REGISTERED

## 2025-07-22 PROCEDURE — 7100000011 HC PHASE II RECOVERY - ADDTL 15 MIN: Performed by: SPECIALIST

## 2025-07-22 PROCEDURE — 3600007502: Performed by: SPECIALIST

## 2025-07-22 PROCEDURE — 3700000000 HC ANESTHESIA ATTENDED CARE: Performed by: SPECIALIST

## 2025-07-22 RX ORDER — SODIUM CHLORIDE 0.9 % (FLUSH) 0.9 %
5-40 SYRINGE (ML) INJECTION PRN
Status: CANCELLED | OUTPATIENT
Start: 2025-07-22

## 2025-07-22 RX ORDER — SIMETHICONE 40MG/0.6ML
40 SUSPENSION, DROPS(FINAL DOSAGE FORM)(ML) ORAL AS NEEDED
Status: CANCELLED | OUTPATIENT
Start: 2025-07-22

## 2025-07-22 RX ORDER — SODIUM CHLORIDE 9 MG/ML
INJECTION, SOLUTION INTRAVENOUS CONTINUOUS
Status: CANCELLED | OUTPATIENT
Start: 2025-07-22

## 2025-07-22 RX ORDER — SODIUM CHLORIDE 9 MG/ML
INJECTION, SOLUTION INTRAVENOUS
Status: DISCONTINUED | OUTPATIENT
Start: 2025-07-22 | End: 2025-07-22 | Stop reason: SDUPTHER

## 2025-07-22 RX ORDER — SIMETHICONE 40MG/0.6ML
SUSPENSION, DROPS(FINAL DOSAGE FORM)(ML) ORAL
Status: DISCONTINUED
Start: 2025-07-22 | End: 2025-07-22 | Stop reason: HOSPADM

## 2025-07-22 RX ORDER — PROPOFOL 10 MG/ML
INJECTION, EMULSION INTRAVENOUS
Status: DISCONTINUED | OUTPATIENT
Start: 2025-07-22 | End: 2025-07-22 | Stop reason: SDUPTHER

## 2025-07-22 RX ADMIN — SODIUM CHLORIDE: 9 INJECTION, SOLUTION INTRAVENOUS at 07:11

## 2025-07-22 RX ADMIN — PROPOFOL 50 MG: 10 INJECTION, EMULSION INTRAVENOUS at 07:32

## 2025-07-22 RX ADMIN — PROPOFOL 180 MCG/KG/MIN: 10 INJECTION, EMULSION INTRAVENOUS at 07:33

## 2025-07-22 RX ADMIN — LIDOCAINE HYDROCHLORIDE 80 MG: 20 INJECTION, SOLUTION INFILTRATION; PERINEURAL at 07:32

## 2025-07-22 ASSESSMENT — PAIN SCALES - GENERAL
PAINLEVEL_OUTOF10: 0

## 2025-07-22 ASSESSMENT — PAIN - FUNCTIONAL ASSESSMENT: PAIN_FUNCTIONAL_ASSESSMENT: 0-10

## 2025-07-22 NOTE — ANESTHESIA PRE PROCEDURE
Department of Anesthesiology  Preprocedure Note       Name:  Gt BAR Friday   Age:  70 y.o.  :  1955                                          MRN:  [id number]         Date:  2025      Surgeon: Surgeon(s):  Edgardo Ghotra MD    Procedure: Procedure(s):  ESOPHAGOGASTRODUODENOSCOPY  COLONOSCOPY DIAGNOSTIC    Medications prior to admission:   Prior to Admission medications    Medication Sig Start Date End Date Taking? Authorizing Provider   ondansetron (ZOFRAN-ODT) 4 MG disintegrating tablet Take 1 tablet by mouth 3 times daily as needed for Nausea or Vomiting 25  Yes Eduar Reid MD   ibuprofen (ADVIL;MOTRIN) 600 MG tablet Take 1 tablet by mouth 3 times daily as needed for Pain 24  Yes Caryl Cruz PA-C   Cholecalciferol 75 MCG (3000 UT) TABS Take by mouth   Yes Automatic Reconciliation, Ar   simvastatin (ZOCOR) 80 MG tablet Take 1 tablet by mouth nightly   Yes Automatic Reconciliation, Ar   tamsulosin (FLOMAX) 0.4 MG capsule Take 1 capsule by mouth daily  Patient not taking: Reported on 2025   Automatic Reconciliation, Ar       Current medications:    Current Facility-Administered Medications   Medication Dose Route Frequency Provider Last Rate Last Admin   • simethicone (MYLICON) 40 MG/0.6ML suspension drops              Facility-Administered Medications Ordered in Other Encounters   Medication Dose Route Frequency Provider Last Rate Last Admin   • 0.9 % sodium chloride infusion   IntraVENous Continuous PRN Herrera Herr APRN - CRNA   New Bag at 25 0711       Allergies:    Allergies   Allergen Reactions   • Penicillins Hives   • Atorvastatin Diarrhea   • Iodinated Contrast Media Rash   • Tomato Rash       Problem List:    Patient Active Problem List   Diagnosis Code   • Feeling of incomplete bladder emptying R39.14   • Benign prostatic hyperplasia with incomplete bladder emptying N40.1, R39.14   • 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

## 2025-07-22 NOTE — ANESTHESIA POSTPROCEDURE EVALUATION
Department of Anesthesiology  Postprocedure Note    Patient: Gt BAR Friday  MRN: [id number]  YOB: 1955  Date of evaluation: 7/22/2025    Procedure Summary       Date: 07/22/25 Room / Location: [facility] ENDO 03 / [facility] ENDOSCOPY    Anesthesia Start: 0730 Anesthesia Stop: 0753    Procedures:       ESOPHAGOGASTRODUODENOSCOPY (Upper GI Region)      COLONOSCOPY DIAGNOSTIC (Lower GI Region)      ESOPHAGOGASTRODUODENOSCOPY BIOPSY (Upper GI Region) Diagnosis:       Hx of colonic polyps      Family history of adenomatous and serrated polyps      Abdominal pain, generalized      Hematochezia      (Hx of colonic polyps [Z86.0100])      (Family history of adenomatous and serrated polyps [Z83.710])      (Abdominal pain, generalized [R10.84])      (Hematochezia [K92.1])    Surgeons: Edgardo Ghotra MD Responsible Provider: Erica Telles MD    Anesthesia Type: MAC ASA Status: 3            Anesthesia Type: MAC    Kyler Phase I: Kyler Score: 10    Kyler Phase II: Kyler Score: 9    Anesthesia Post Evaluation    Patient location during evaluation: PACU  Patient participation: complete - patient participated  Level of consciousness: awake  Airway patency: patent  Nausea & Vomiting: no vomiting and no nausea  Cardiovascular status: hemodynamically stable  Respiratory status: acceptable  Hydration status: stable  Pain management: adequate    No notable events documented.

## 2025-07-22 NOTE — PROGRESS NOTES
Gt W Friday 1955  [id number]    Situation:  Verbal report received from: ANTHONY Gage   Procedure: Procedure(s):  ESOPHAGOGASTRODUODENOSCOPY  COLONOSCOPY DIAGNOSTIC  ESOPHAGOGASTRODUODENOSCOPY BIOPSY    Background:    Preoperative diagnosis: Hx of colonic polyps [Z86.0100]  Family history of adenomatous and serrated polyps [Z83.710]  Abdominal pain, generalized [R10.84]  Hematochezia [K92.1]  Postoperative diagnosis: * No post-op diagnosis entered *    :  Dr. Ghotra   Assistant(s): Circulator: Merari Jacob RN  Circulator Assist: Ivett Aguilar RN    Specimens:   ID Type Source Tests Collected by Time Destination   1 : duodenum biopsy Tissue Duodenum SURGICAL PATHOLOGY Edgardo Ghotra MD 7/22/2025 0738      H. Pylori     no    Assessment:  Intra-procedure medications g    Anesthesia gave intra-procedure sedation and medications, see anesthesia flow sheet   yes    Intravenous fluids: NS@ KVO     Vital signs stable    yes    Abdominal assessment: round and soft   yes     Recommendation:  Discharge patient per MD order   yes.  Return to floor  outpatient  Family or Friend  spouse  Permission to share finding with family or friend    yes

## 2025-07-22 NOTE — DISCHARGE INSTRUCTIONS
[facility]  [facility]  Edgardo Diaz MD  [phone]      July 22, 2025    Gt BAR Friday  YOB: 1955    COLONOSCOPY DISCHARGE INSTRUCTIONS    If there is redness at IV site you should apply warm compress to area.  If redness or soreness persist contact Dr. Diaz' or your primary care doctor.    There may be a slight amount of blood passed from the rectum.  Gaseous discomfort may develop, but walking, belching will help relieve this.  You may not operate a vehicle for 12 hours  You may not operate machinery or dangerous appliances for rest of today  You may not drink alcoholic beverages for 12 hours  Avoid making any critical decisions for 24 hours    DIET:  You may resume your normal diet, but some patients find that heavy or large meals may lead to indigestion or vomiting.  I suggest a light meal as first food intake.    MEDICATIONS:  The use of some over-the-counter pain medication may lead to bleeding after colon biopsies or polyp removal.  Tylenol (also called acetaminophen) is safe to take even if you have had colonoscopy with polyp removal.  Based on the procedure you had today you may not safely take aspirin or aspirin-like products for the next ten (10) days.  Remember that Tylenol (also called acetaminophen) is safe to take after colonoscopy even if you have had biopsies or polyps removed.    ACTIVITY:  You may resume your normal household activities, but it is recommended that you spend the remainder of the day resting -  avoid any strenuous activity.    CALL DR. DIAZ' OFFICE IF:  Increasing pain, nausea, vomiting  Abdominal distension (swelling)  Significant new or increased bleeding (oral or rectal)  Fever/Chills  Chest pain/shortness of breath                       Additional instructions:   Great news, no colon cancer, no colon polyps and the upper GI exam was normal as well.  Because of your symptoms of

## 2025-07-22 NOTE — H&P
Date: 6/10/2025 1:15 PM   Patient Name:    Account #: [id number]    Gender: Male    (age): 1955 (70)      Provider: Edgardo Ghotra MD      Referring Physician: [facility]  [address] [place]  [phone] (phone)  [phone] (fax)      Chief Complaint: BRBPR and some throat scratchiness.           History of Present Illness:   70 M whose brother  of colon cancer in his early 60s had colonsocopy  with small adenomas remvoed; due for colonoscopy in the next 12 months has noted some fresh blood in stool; complains of diffuse abdominal achyness, not specifically located in any particular area, seems to be incited by meals, associated with constipation, but notes no vomiting, dysphagia, fever chills.  He's concerned about his digestive health given his brother's diagnosis and we negotiated bidirectional endoscopy for evaluation.      Past Medical History      Medical Conditions: High blood pressure  Hypercholesterolemia   Surgical Procedures: Colonoscopy 2016 polyps   Dx Studies: Colonoscopy, 2021   Medications: Patient Takes No Medications   Allergies: Penicillins   Immunizations: Influenza, seasonal, injectable, 10/01/2024  pneumococcal polysaccharide PPV23, 10/01/2024  COVID Vaccine  Influenza vaccination 2020        \" displaymode=\"List\" defaultnarrative=\"[object Object]\" isempty=\"false\" style=\"text-rendering: geometricprecision; font-family: \"Open Sans\", sans-serif !important; -webkit-font-smoothing: antialiased !important; user-select: text !important;\">  Impressions: Family history of malignant neoplasm of gastrointestinal tract  Hematochezia  Generalized abdominal pain      Plan: Upper Endoscopy  Colonoscopy      Risk & Medical Necessity: The level of medical decision making for this visit is low. The number and complexity of problems addressed are moderate. The risk of complications and/or morbidity or mortality of patient management is

## 2025-07-22 NOTE — H&P
Pre-Endoscopy H&P Update  Chief complaint/HPI/ROS:  The indication for the procedure, the patient's history and the patient's current medications are reviewed prior to the procedure and that data is reported on the H&P to which this document is attached.  Any significant complaints with regard to organ systems will be noted, and if not mentioned then a review of systems is not contributory.  Past Medical History:   Diagnosis Date    Hypercholesterolemia     Hypertension     Sleep apnea     uses cpap at home.       Past Surgical History:   Procedure Laterality Date    COLONOSCOPY N/A 7/28/2021    COLONOSCOPY performed by Edgardo Ghotra MD at [facility] ENDOSCOPY     Social   Social History     Tobacco Use    Smoking status: Never    Smokeless tobacco: Never   Substance Use Topics    Alcohol use: Never      History reviewed. No pertinent family history.   Allergies   Allergen Reactions    Penicillins Hives    Atorvastatin Diarrhea    Iodinated Contrast Media Rash    Tomato Rash      Prior to Admission Medications   Prescriptions Last Dose Informant Patient Reported? Taking?   Cholecalciferol 75 MCG (3000 UT) TABS Past Week  Yes Yes   Sig: Take by mouth   ibuprofen (ADVIL;MOTRIN) 600 MG tablet Taking Differently  No Yes   Sig: Take 1 tablet by mouth 3 times daily as needed for Pain   ondansetron (ZOFRAN-ODT) 4 MG disintegrating tablet Taking Differently  No Yes   Sig: Take 1 tablet by mouth 3 times daily as needed for Nausea or Vomiting   simvastatin (ZOCOR) 80 MG tablet Past Month  Yes Yes   Sig: Take 1 tablet by mouth nightly   tamsulosin (FLOMAX) 0.4 MG capsule Not Taking  Yes No   Sig: Take 1 capsule by mouth daily   Patient not taking: Reported on 7/22/2025      Facility-Administered Medications: None       PHYSICAL EXAM:  The patient is examined immediately prior to the procedure.  Vitals:    07/22/25 0642   BP: (!) 161/83   Pulse: 63   Resp: 17   Temp: 97.7 °F (36.5 °C)   SpO2: 98%     Gen: Appears comfortable,

## 2025-07-22 NOTE — OP NOTE
in Detail:  After obtaining informed consent, positioning of the patient in the left lateral decubitus position, and conduction of a pre-procedure pause or \"time out\" the endoscope was introduced into the anus and advanced to the cecum, which was identified by the ileocecal valve and appendiceal orifice.  The quality of the colonic preparation was good.  A careful inspection was made as the colonoscope was withdrawn, findings and interventions are described below.    Findings:   There is diverticulosis in the sigmoid colon without complications such as bleeding, inflammatory change, or luminal narrowing.    Small internal hemorrhoids without bleeding.    ------------------------------  Specimens:    See above    Complications:   None; patient tolerated the procedure well.    Impressions:  EGD:  Normal EGD.  Colonoscopy: Otherwise normal colonoscopy to the cecum      Recommendations:     - Repeat colonoscopy in 5 years.  -Await pathology.    Thank you for entrusting me with this patient's care.  Please do not hesitate to contact me with any questions or if I can be of assistance with any of your other patients' GI needs.    Signed By: Edgardo Ghotra MD                        July 22, 2025    Surgical assistant none.  Implants none unless specified.

## (undated) DEVICE — SYRINGE MED 5ML STD CLR PLAS LUERLOCK TIP N CTRL DISP

## (undated) DEVICE — 1200 GUARD II KIT W/5MM TUBE W/O VAC TUBE: Brand: GUARDIAN

## (undated) DEVICE — KIT COLON W/ 1.1OZ LUB AND 2 END

## (undated) DEVICE — SET ADMIN 16ML TBNG L100IN 2 Y INJ SITE IV PIGGY BK DISP (ORDER IN MULIPLES OF 48)

## (undated) DEVICE — BLUNTFILL: Brand: MONOJECT

## (undated) DEVICE — 3M™ CUROS™ DISINFECTING CAP FOR NEEDLELESS CONNECTORS 270/CARTON 20 CARTONS/CASE CFF1-270: Brand: CUROS™

## (undated) DEVICE — BAG SPEC BIOHZRD 10 X 10 IN --

## (undated) DEVICE — SYRINGE MEDICAL 3ML CLEAR PLASTIC STANDARD NON CONTROL LUERLOCK TIP DISPOSABLE

## (undated) DEVICE — SOLIDIFIER FLD 2OZ 1500CC N DISINF IN BTL DISP SAFESORB

## (undated) DEVICE — ELECTRODE,RADIOTRANSLUCENT,FOAM,3PK: Brand: MEDLINE

## (undated) DEVICE — CUFF RMFG BP INF SZ 11 DISP -- LAWSON OEM ITEM 238915

## (undated) DEVICE — (D)SENSOR RMFG 02 PULS OXMTR -- DISC BY MFR USE ITEM 133445

## (undated) DEVICE — POLYP TRAP: Brand: TRAPEASE®

## (undated) DEVICE — CANNULA CUSH AD W/ 14FT TBG

## (undated) DEVICE — SET GRAV CK VLV NEEDLESS ST 3 GANGED 4WAY STPCOCK HI FLO 10

## (undated) DEVICE — BLUNTFILL WITH FILTER: Brand: MONOJECT

## (undated) DEVICE — SIMPLICITY FLUFF UNDERPAD 23X36, MODERATE: Brand: SIMPLICITY

## (undated) DEVICE — SNARE ENDOSCP M L240CM W27MM SHTH DIA2.4MM CHN 2.8MM OVL

## (undated) DEVICE — BITEBLOCK ENDOSCP 60FR MAXI WHT POLYETH STURDY W/ VELC WVN

## (undated) DEVICE — CONTAINER SPEC 20 ML LID NEUT BUFF FORMALIN 10 % POLYPR STS

## (undated) DEVICE — BASIN EMSIS 16OZ GRAPHITE PLAS KID SHP MOLD GRAD FOR ORAL

## (undated) DEVICE — Device

## (undated) DEVICE — CATH IV AUTOGRD BC BLU 22GA 25 -- INSYTE

## (undated) DEVICE — BAG BELONG PT PERS CLEAR HANDL

## (undated) DEVICE — IV STRT KT 3282] LSL INDUSTRIES INC]

## (undated) DEVICE — CATHETER IV 22GA L1IN OD0.8382-0.9144MM ID0.6096-0.6858MM 382523

## (undated) DEVICE — SOLIDIFIER MEDC 1200ML -- CONVERT TO 356117